# Patient Record
Sex: MALE | Race: WHITE | NOT HISPANIC OR LATINO | URBAN - METROPOLITAN AREA
[De-identification: names, ages, dates, MRNs, and addresses within clinical notes are randomized per-mention and may not be internally consistent; named-entity substitution may affect disease eponyms.]

---

## 2017-06-16 PROBLEM — G47.33 OSA (OBSTRUCTIVE SLEEP APNEA): Chronic | Status: ACTIVE | Noted: 2017-06-16

## 2018-07-10 ENCOUNTER — APPOINTMENT (RX ONLY)
Dept: URBAN - METROPOLITAN AREA CLINIC 349 | Facility: CLINIC | Age: 63
Setting detail: DERMATOLOGY
End: 2018-07-10

## 2018-07-10 DIAGNOSIS — Z71.89 OTHER SPECIFIED COUNSELING: ICD-10-CM

## 2018-07-10 DIAGNOSIS — D18.0 HEMANGIOMA: ICD-10-CM

## 2018-07-10 DIAGNOSIS — D22 MELANOCYTIC NEVI: ICD-10-CM

## 2018-07-10 DIAGNOSIS — L82.1 OTHER SEBORRHEIC KERATOSIS: ICD-10-CM

## 2018-07-10 PROBLEM — L23.7 ALLERGIC CONTACT DERMATITIS DUE TO PLANTS, EXCEPT FOOD: Status: ACTIVE | Noted: 2018-07-10

## 2018-07-10 PROBLEM — D22.5 MELANOCYTIC NEVI OF TRUNK: Status: ACTIVE | Noted: 2018-07-10

## 2018-07-10 PROBLEM — D18.01 HEMANGIOMA OF SKIN AND SUBCUTANEOUS TISSUE: Status: ACTIVE | Noted: 2018-07-10

## 2018-07-10 PROBLEM — F41.9 ANXIETY DISORDER, UNSPECIFIED: Status: ACTIVE | Noted: 2018-07-10

## 2018-07-10 PROCEDURE — 99242 OFF/OP CONSLTJ NEW/EST SF 20: CPT

## 2018-07-10 PROCEDURE — ? COUNSELING

## 2018-07-10 ASSESSMENT — LOCATION DETAILED DESCRIPTION DERM
LOCATION DETAILED: LEFT MEDIAL INFERIOR CHEST
LOCATION DETAILED: LEFT SUPERIOR UPPER BACK
LOCATION DETAILED: LEFT CLAVICULAR SKIN
LOCATION DETAILED: EPIGASTRIC SKIN
LOCATION DETAILED: LEFT MEDIAL INFERIOR CHEST
LOCATION DETAILED: STERNUM
LOCATION DETAILED: RIGHT CLAVICULAR NECK
LOCATION DETAILED: RIGHT SUPERIOR UPPER BACK
LOCATION DETAILED: LEFT CLAVICULAR NECK
LOCATION DETAILED: RIGHT ANTERIOR SHOULDER
LOCATION DETAILED: RIGHT LATERAL ABDOMEN
LOCATION DETAILED: LEFT MEDIAL UPPER BACK
LOCATION DETAILED: RIGHT CLAVICULAR SKIN
LOCATION DETAILED: LEFT CLAVICULAR NECK
LOCATION DETAILED: RIGHT MEDIAL INFERIOR CHEST
LOCATION DETAILED: RIGHT MID-UPPER BACK
LOCATION DETAILED: RIGHT LATERAL SUPERIOR CHEST

## 2018-07-10 ASSESSMENT — LOCATION SIMPLE DESCRIPTION DERM
LOCATION SIMPLE: LEFT ANTERIOR NECK
LOCATION SIMPLE: LEFT UPPER BACK
LOCATION SIMPLE: LEFT ANTERIOR NECK
LOCATION SIMPLE: RIGHT UPPER BACK
LOCATION SIMPLE: ABDOMEN
LOCATION SIMPLE: CHEST
LOCATION SIMPLE: RIGHT UPPER BACK
LOCATION SIMPLE: RIGHT CLAVICULAR SKIN
LOCATION SIMPLE: LEFT CLAVICULAR SKIN
LOCATION SIMPLE: RIGHT SHOULDER
LOCATION SIMPLE: CHEST
LOCATION SIMPLE: RIGHT ANTERIOR NECK

## 2018-07-10 ASSESSMENT — LOCATION ZONE DERM
LOCATION ZONE: TRUNK
LOCATION ZONE: TRUNK
LOCATION ZONE: NECK
LOCATION ZONE: NECK
LOCATION ZONE: ARM

## 2018-09-17 PROBLEM — N40.1 BENIGN PROSTATIC HYPERPLASIA WITH URINARY HESITANCY: Status: ACTIVE | Noted: 2018-09-17

## 2018-09-17 PROBLEM — R97.20 ELEVATED PSA: Status: ACTIVE | Noted: 2018-09-17

## 2018-09-17 PROBLEM — R39.11 BENIGN PROSTATIC HYPERPLASIA WITH URINARY HESITANCY: Status: ACTIVE | Noted: 2018-09-17

## 2019-04-03 ENCOUNTER — PATIENT OUTREACH (OUTPATIENT)
Dept: CASE MANAGEMENT | Age: 64
End: 2019-04-03

## 2019-04-03 NOTE — PATIENT INSTRUCTIONS
4/3/19 Referral received from Adela Salazar NP for resources for local pharmacy, OTC allowance and also transportation. Called pt and states has had issues with getting medications due to cost in the past and stressed the importance to pt with medication compliance and reporting if has issues or side effects with medications asap. Pt states sees pcp but has not been able to get referral to psychiatrist or psychologist and has asked mutiple times. Offered assistance to pt with 3 way call to pcp to request and declined and states has researched [employer] benefit book and has a name of someone he would like to see and will reach out to them. Provided pt with resource for [employer], OTC allowance and also the [employer] transportation portion. Pt also advised of the local Mental health facility in [facility] and hospitals is really wanting to try person he has researched. Pt advised may contact care  with questions and verbalizes an understanding.

## 2019-04-03 NOTE — PROGRESS NOTES
4/3/19 Referral received from Adela Salazar NP for resources for local pharmacy, OTC allowance and also transportation. Called pt and states has had issues with getting medications due to cost in the past and stressed the importance to pt with medication compliance and reporting if has issues or side effects with medications asap. Pt states sees pcp but has not been able to get referral to psychiatrist or psychologist and has asked mutiple times. Offered assistance to pt with 3 way call to pcp to request and declined and states has researched [employer] benefit book and has a name of someone he would like to see and will reach out to them. Provided pt with resource for [employer], OTC allowance and also the [employer] transportation portion. Pt also advised of the local Mental health facility in [facility] and [facility] is really wanting to try person he has researched. Pt advised may contact care  with questions and verbalizes an understanding.

## 2019-07-22 ENCOUNTER — APPOINTMENT (RX ONLY)
Dept: URBAN - METROPOLITAN AREA CLINIC 349 | Facility: CLINIC | Age: 64
Setting detail: DERMATOLOGY
End: 2019-07-22

## 2019-07-22 DIAGNOSIS — L82.1 OTHER SEBORRHEIC KERATOSIS: ICD-10-CM

## 2019-07-22 DIAGNOSIS — D22 MELANOCYTIC NEVI: ICD-10-CM

## 2019-07-22 DIAGNOSIS — D18.0 HEMANGIOMA: ICD-10-CM

## 2019-07-22 PROBLEM — D22.5 MELANOCYTIC NEVI OF TRUNK: Status: ACTIVE | Noted: 2019-07-22

## 2019-07-22 PROBLEM — D18.01 HEMANGIOMA OF SKIN AND SUBCUTANEOUS TISSUE: Status: ACTIVE | Noted: 2019-07-22

## 2019-07-22 PROBLEM — F32.9 MAJOR DEPRESSIVE DISORDER, SINGLE EPISODE, UNSPECIFIED: Status: ACTIVE | Noted: 2019-07-22

## 2019-07-22 PROCEDURE — ? COUNSELING

## 2019-07-22 PROCEDURE — 99213 OFFICE O/P EST LOW 20 MIN: CPT

## 2019-07-22 ASSESSMENT — LOCATION ZONE DERM
LOCATION ZONE: ARM
LOCATION ZONE: TRUNK
LOCATION ZONE: NECK
LOCATION ZONE: NECK
LOCATION ZONE: TRUNK

## 2019-07-22 ASSESSMENT — LOCATION SIMPLE DESCRIPTION DERM
LOCATION SIMPLE: CHEST
LOCATION SIMPLE: RIGHT CLAVICULAR SKIN
LOCATION SIMPLE: RIGHT UPPER BACK
LOCATION SIMPLE: RIGHT SHOULDER
LOCATION SIMPLE: LEFT CLAVICULAR SKIN
LOCATION SIMPLE: LEFT UPPER BACK
LOCATION SIMPLE: ABDOMEN
LOCATION SIMPLE: RIGHT UPPER BACK
LOCATION SIMPLE: CHEST
LOCATION SIMPLE: LEFT ANTERIOR NECK
LOCATION SIMPLE: LEFT ANTERIOR NECK
LOCATION SIMPLE: RIGHT ANTERIOR NECK

## 2019-07-22 ASSESSMENT — LOCATION DETAILED DESCRIPTION DERM
LOCATION DETAILED: LEFT MEDIAL INFERIOR CHEST
LOCATION DETAILED: LEFT SUPERIOR UPPER BACK
LOCATION DETAILED: RIGHT CLAVICULAR NECK
LOCATION DETAILED: LEFT MEDIAL UPPER BACK
LOCATION DETAILED: RIGHT ANTERIOR SHOULDER
LOCATION DETAILED: RIGHT CLAVICULAR SKIN
LOCATION DETAILED: LEFT CLAVICULAR SKIN
LOCATION DETAILED: RIGHT SUPERIOR UPPER BACK
LOCATION DETAILED: RIGHT MID-UPPER BACK
LOCATION DETAILED: LEFT CLAVICULAR NECK
LOCATION DETAILED: RIGHT LATERAL ABDOMEN
LOCATION DETAILED: LEFT MEDIAL INFERIOR CHEST
LOCATION DETAILED: EPIGASTRIC SKIN
LOCATION DETAILED: RIGHT LATERAL SUPERIOR CHEST
LOCATION DETAILED: RIGHT MEDIAL INFERIOR CHEST
LOCATION DETAILED: STERNUM
LOCATION DETAILED: LEFT CLAVICULAR NECK

## 2022-03-18 PROBLEM — R39.11 BENIGN PROSTATIC HYPERPLASIA WITH URINARY HESITANCY: Status: ACTIVE | Noted: 2018-09-17

## 2022-03-18 PROBLEM — N40.1 BENIGN PROSTATIC HYPERPLASIA WITH URINARY HESITANCY: Status: ACTIVE | Noted: 2018-09-17

## 2022-03-19 PROBLEM — G47.33 OSA (OBSTRUCTIVE SLEEP APNEA): Status: ACTIVE | Noted: 2017-06-16

## 2022-03-20 PROBLEM — R97.20 ELEVATED PSA: Status: ACTIVE | Noted: 2018-09-17

## 2022-03-30 ENCOUNTER — HOSPITAL ENCOUNTER (OUTPATIENT)
Dept: ULTRASOUND IMAGING | Age: 67
Discharge: HOME | End: 2022-03-30
Attending: STUDENT IN AN ORGANIZED HEALTH CARE EDUCATION/TRAINING PROGRAM
Payer: MEDICARE

## 2022-03-30 DIAGNOSIS — M79.89 RIGHT LEG SWELLING: ICD-10-CM

## 2022-03-30 PROCEDURE — 93971 EXTREMITY STUDY: CPT

## 2022-03-30 NOTE — PROGRESS NOTES
LVM detailed advising pt  Per Dr Ronan Valero   Ultrasound of right leg negative for DVT/blood clot.

## 2022-05-29 NOTE — PROGRESS NOTES
Kris Gonzales (:  1955) is a 79 y.o. male,Established patient, here for evaluation of the following chief complaint(s):  Follow-up (3 mth fu and refill) and Dizziness         ASSESSMENT/PLAN:  1. Impaired fasting glucose  Fasting blood sugar elevated at 118 on 2022  A1c 6.1% on 3/3/2022, recheck in office today improved at 5.8   Continue lifestyle modifications    2. Dyslipidemia  Lipid panel from 2022 with ASCVD risk of 17.2%  Continue lovastatin    - lovastatin (MEVACOR) 40 MG tablet; Take 1 tablet by mouth nightly  Dispense: 90 tablet; Refill: 3    3. Bipolar affective disorder, current episode mixed, current episode severity unspecified ([facility] [address]  4. Long-term current use of benzodiazepine  PHQ-9 score of 22, BINU-7 score of 18 on 2022  PDMP reviewed showing lorazepam 2 mg tablets, quantity #90, 30-day supply last filled on 4/3/2022. Continue amitriptyline and as needed lorazepam  Psychiatry referral previously placed    5. Essential hypertension  Continue metoprolol tartrate    - metoprolol tartrate (LOPRESSOR) 50 MG tablet; Take 1 tablet by mouth 2 times daily  Dispense: 180 tablet; Refill: 3    6. Dizziness  Differential diagnosis includes BPPV versus dehydration versus iatrogenic hypotension or bradycardia if patient accidentally took extra dose of metoprolol  No focal neurological deficits on exam today, hold off neuroimaging  EKG on 3/3/2022 with sinus tachycardia with heart rate of 120 bpm, T wave inversion in aVL but no other acute ST segment or T wave abnormalities (of note at that time patient had forgotten to take his morning dose of metoprolol)  Maintain adequate hydration, monitor for symptom recurrence        Subjective   SUBJECTIVE/OBJECTIVE:  Patient is a 26-year-old  male who presents to the office today for follow-up.   Patient states last month on May 18 in the afternoon approximately 30 minutes after taking his metoprolol for blood pressure he started feeling dizzy and off-balance. Denied associated chest pain, shortness of breath, numbness, paresthesias or palpitations. Did have some nausea and sensation of off balance with photosensitivity. No phonophobia, headache, vision changes. States it felt different than panic attacks he has had before. Did not check his blood pressure or heart rate at that time. His wife who is concerned he may have been dehydrated made him drink some Gatorade. An hour later patient took his nighttime amitriptyline and lorazepam and fell asleep. The next morning when he woke up he felt slightly fearful but afterward felt back to normal.  Episodes have failed to recur since then. Uncertain if patient took an extra dose of metoprolol by mistake or if it could be due to his eyeglasses with the frames being slightly offkilter. Review of Systems   HENT:        Denies phonophobia   Eyes: Positive for photophobia. Respiratory: Negative for shortness of breath. Cardiovascular: Negative for chest pain and palpitations. Gastrointestinal: Positive for nausea. Neurological: Positive for dizziness and light-headedness. Negative for numbness. Denies paresthesias   Psychiatric/Behavioral: The patient is nervous/anxious. Objective   Physical Exam  Vitals reviewed. Constitutional:       General: He is not in acute distress. Appearance: Normal appearance. He is not ill-appearing, toxic-appearing or diaphoretic. HENT:      Head: Normocephalic and atraumatic. Right Ear: Tympanic membrane, ear canal and external ear normal. There is no impacted cerumen. Left Ear: Ear canal and external ear normal.      Ears:      Comments: Cerumen in left canal limiting complete visualization of the tympanic membrane but from what can be visualized no acute abnormalities     Mouth/Throat:      Pharynx: No oropharyngeal exudate or posterior oropharyngeal erythema. Eyes:      General: No scleral icterus.         Right eye: No discharge. Left eye: No discharge. Extraocular Movements: Extraocular movements intact. Comments: Visual fields grossly intact and symmetric bilaterally   Neck:      Vascular: No carotid bruit. Cardiovascular:      Rate and Rhythm: Normal rate and regular rhythm. Heart sounds: No murmur heard. No friction rub. No gallop. Pulmonary:      Effort: Pulmonary effort is normal. No respiratory distress. Breath sounds: No wheezing, rhonchi or rales. Abdominal:      General: Bowel sounds are normal.      Palpations: Abdomen is soft. Tenderness: There is no abdominal tenderness. There is no guarding. Skin:     General: Skin is warm and dry. Coloration: Skin is not jaundiced. Neurological:      Mental Status: He is alert. Mental status is at baseline. Cranial Nerves: No cranial nerve deficit. Sensory: No sensory deficit. Motor: Tremor (Mild involving both hands) present. No weakness (Muscle strength 5/5 and symmetric in all 4 extremities) or pronator drift. Coordination: Finger-Nose-Finger Test normal.   Psychiatric:         Attention and Perception: Attention normal.         Mood and Affect: Mood and affect normal. Mood is not anxious or depressed. Affect is not tearful. Speech: Speech normal. Speech is not rapid and pressured or slurred. Behavior: Behavior normal. Behavior is not agitated, aggressive or combative. Behavior is cooperative. An electronic signature was used to authenticate this note.     --Cristino Hall,

## 2022-06-02 ENCOUNTER — OFFICE VISIT (OUTPATIENT)
Dept: INTERNAL MEDICINE CLINIC | Facility: CLINIC | Age: 67
End: 2022-06-02
Payer: MEDICARE

## 2022-06-02 VITALS
SYSTOLIC BLOOD PRESSURE: 128 MMHG | WEIGHT: 202 LBS | TEMPERATURE: 97.5 F | HEIGHT: 74 IN | DIASTOLIC BLOOD PRESSURE: 72 MMHG | BODY MASS INDEX: 25.93 KG/M2 | OXYGEN SATURATION: 95 % | HEART RATE: 92 BPM

## 2022-06-02 DIAGNOSIS — E78.5 DYSLIPIDEMIA: ICD-10-CM

## 2022-06-02 DIAGNOSIS — R73.01 IMPAIRED FASTING GLUCOSE: Primary | ICD-10-CM

## 2022-06-02 DIAGNOSIS — F31.60 BIPOLAR AFFECTIVE DISORDER, CURRENT EPISODE MIXED, CURRENT EPISODE SEVERITY UNSPECIFIED (HCC): ICD-10-CM

## 2022-06-02 DIAGNOSIS — I10 ESSENTIAL HYPERTENSION: ICD-10-CM

## 2022-06-02 DIAGNOSIS — Z79.899 LONG-TERM CURRENT USE OF BENZODIAZEPINE: ICD-10-CM

## 2022-06-02 DIAGNOSIS — R42 DIZZINESS: ICD-10-CM

## 2022-06-02 LAB — HBA1C MFR BLD: 5.8 %

## 2022-06-02 PROCEDURE — G8417 CALC BMI ABV UP PARAM F/U: HCPCS | Performed by: STUDENT IN AN ORGANIZED HEALTH CARE EDUCATION/TRAINING PROGRAM

## 2022-06-02 PROCEDURE — 99214 OFFICE O/P EST MOD 30 MIN: CPT | Performed by: STUDENT IN AN ORGANIZED HEALTH CARE EDUCATION/TRAINING PROGRAM

## 2022-06-02 PROCEDURE — 83036 HEMOGLOBIN GLYCOSYLATED A1C: CPT | Performed by: STUDENT IN AN ORGANIZED HEALTH CARE EDUCATION/TRAINING PROGRAM

## 2022-06-02 PROCEDURE — 3017F COLORECTAL CA SCREEN DOC REV: CPT | Performed by: STUDENT IN AN ORGANIZED HEALTH CARE EDUCATION/TRAINING PROGRAM

## 2022-06-02 PROCEDURE — 1036F TOBACCO NON-USER: CPT | Performed by: STUDENT IN AN ORGANIZED HEALTH CARE EDUCATION/TRAINING PROGRAM

## 2022-06-02 PROCEDURE — G8427 DOCREV CUR MEDS BY ELIG CLIN: HCPCS | Performed by: STUDENT IN AN ORGANIZED HEALTH CARE EDUCATION/TRAINING PROGRAM

## 2022-06-02 PROCEDURE — 1123F ACP DISCUSS/DSCN MKR DOCD: CPT | Performed by: STUDENT IN AN ORGANIZED HEALTH CARE EDUCATION/TRAINING PROGRAM

## 2022-06-02 RX ORDER — METOPROLOL TARTRATE 50 MG/1
50 TABLET, FILM COATED ORAL 2 TIMES DAILY
Qty: 180 TABLET | Refills: 3 | Status: SHIPPED | OUTPATIENT
Start: 2022-06-02 | End: 2022-09-01 | Stop reason: SDUPTHER

## 2022-06-02 RX ORDER — LOVASTATIN 40 MG/1
40 TABLET ORAL NIGHTLY
Qty: 90 TABLET | Refills: 3 | Status: SHIPPED | OUTPATIENT
Start: 2022-06-02 | End: 2022-09-01 | Stop reason: SDUPTHER

## 2022-06-02 ASSESSMENT — ENCOUNTER SYMPTOMS
PHOTOPHOBIA: 1
SHORTNESS OF BREATH: 0
NAUSEA: 1

## 2022-08-04 DIAGNOSIS — F31.60 BIPOLAR AFFECTIVE DISORDER, CURRENT EPISODE MIXED, CURRENT EPISODE SEVERITY UNSPECIFIED (HCC): Primary | ICD-10-CM

## 2022-08-04 RX ORDER — LORAZEPAM 2 MG/1
2 TABLET ORAL EVERY 8 HOURS PRN
Qty: 90 TABLET | Refills: 2 | Status: SHIPPED | OUTPATIENT
Start: 2022-08-04 | End: 2022-09-01 | Stop reason: SDUPTHER

## 2022-08-04 NOTE — TELEPHONE ENCOUNTER
Patient requesting refill of lorazepam.  PDMP reviewed showing lorazepam 2 mg tablets, quantity #90, 30-day supply last filled on 7/5/2022. New prescription sent to pharmacy on record. Orders Placed This Encounter    LORazepam (ATIVAN) 2 MG tablet     Sig: Take 1 tablet by mouth every 8 hours as needed for Anxiety for up to 90 days.      Dispense:  90 tablet     Refill:  2

## 2022-08-28 NOTE — PROGRESS NOTES
[facility] (:  1955) is a 79 y.o. male,Established patient, here for evaluation of the following chief complaint(s):  Follow-up (Pt is here for 3 month follow up. ), Medication Refill (Pt needs a refill on Triamcinolone Cream. ), Finger Pain (Pt c/o still having pain in right index finger./), and Skin Problem (Pt c/o a place on his chest and neck that he would like to have checked. )         ASSESSMENT/PLAN:  1. Prediabetes  A1c 5.8% on 2022  Continue lifestyle modifications    2. B[facility] affective disorder, [facility] status unspecified ([facility]  3. Long-term current use of benzodiazepine  PHQ-9 score of 22, BINU-7 score of 18 on 2022  Continue as needed lorazepam  Previously placed referral to psychiatry  PDMP reviewed showing lorazepam 2 mg tablets, quantity #90, 30-day supply last filled on 2022    - LORazepam (ATIVAN) 2 MG tablet; Take 1 tablet by mouth every 8 hours as needed for Anxiety for up to 90 days. Dispense: 270 tablet; Refill: 0    4. Essential hypertension  Continue metoprolol tartrate    - metoprolol tartrate (LOPRESSOR) 50 MG tablet; Take 1 tablet by mouth 2 times daily  Dispense: 180 tablet; Refill: 2    5. Dyslipidemia  Lipid panel from 2022 with ASCVD risk of 17.2%  Continue lovastatin    - lovastatin (MEVACOR) 40 MG tablet; Take 1 tablet by mouth nightly  Dispense: 90 tablet; Refill: 2    6. Insomnia, unspecified type  Continue amitriptyline    - amitriptyline (ELAVIL) 50 MG tablet; Take 1 tablet by mouth nightly  Dispense: 90 tablet; Refill: 2    7. History of dermatitis  Refill as needed triamcinolone    - triamcinolone (KENALOG) 0.1 % cream; Use thin layer to affected area as needed for rash  Dispense: 454 g; Refill: 1    8. Skin lesion of chest wall  9. Skin lesion of neck  10.  Skin cancer screening  Suspect chest wall lesion likely hemangioma, neck lesion likely skin tag  Given history of long-term sun exposure refer to dermatology for evaluation and skin cancer screening    - External Referral to Dermatology    11. Encounter for vaccination  Patient recently nicked by a cat, uncertain when last tetanus vaccine was  Tdap vaccination administered in office today    - Tdap, [address], (age 8 yrs+), IM      Return in about 3 months (around 12/1/2022) for EOV . Subjective   SUBJECTIVE/OBJECTIVE:  Patient is a 71-year-old  male who presents to the office today for follow-up. Patient tolerating as needed lorazepam not always taking up to 3 times a day depending on his mood. Sometimes takes it before he goes to [employer] but denies grogginess or difficulty driving. Sleep seems to be improved with use of amitriptyline. Has still not heard from psychiatry. No suicidal or homicidal ideations. Has had a skin lesion slightly raised and red in color on the anterior chest wall for the past several months, sometimes decreasing in size. Has tried a variety of topical therapies. Also has a separate skin lesion on his neck and his nose for the past 2 weeks. Neither area is causing him pain or itching. Denies chest pain, shortness of breath, abdominal pain, suicidal or homicidal ideations. Patient still having intermittent pain in both index fingers but without significant swelling, redness or warmth. Believes it is due to constantly using his fingers and twisting and wrenching motions. Review of Systems   Respiratory:  Negative for shortness of breath. Cardiovascular:  Negative for chest pain. Gastrointestinal:  Negative for abdominal pain. Musculoskeletal:  Positive for arthralgias. Negative for joint swelling. Skin:         Positive for skin lesion on neck and chest   Psychiatric/Behavioral:  Negative for self-injury and suicidal ideas. The patient is nervous/anxious. Objective   Physical Exam  Vitals reviewed. Constitutional:       General: He is not in acute distress. Appearance: Normal appearance.  He is not ill-appearing, toxic-appearing or diaphoretic. HENT:      Head: Normocephalic and atraumatic. Eyes:      General:         Right eye: No discharge. Left eye: No discharge. Extraocular Movements: Extraocular movements intact. Cardiovascular:      Rate and Rhythm: Normal rate and regular rhythm. Heart sounds: No murmur heard. No friction rub. No gallop. Pulmonary:      Effort: Pulmonary effort is normal. No respiratory distress. Breath sounds: No wheezing, rhonchi or rales. Abdominal:      General: Bowel sounds are normal.      Palpations: Abdomen is soft. Tenderness: There is no abdominal tenderness. There is no guarding. Musculoskeletal:      Comments: 1 cm superficial skin break along left calf without erythema, streaking, fluctuance, bleeding or drainage   Skin:     General: Skin is warm and dry. Coloration: Skin is not jaundiced. Comments: Approxi-1 cm cherry colored papule along right chest wall  Approxi-1 cm flesh-colored tag-like lesion of left anterolateral neck   Neurological:      Mental Status: He is alert. Mental status is at baseline. Psychiatric:         Attention and Perception: Attention normal.         Mood and Affect: Mood and affect normal. Mood is not depressed. Affect is not tearful. Speech: Speech normal. Speech is not rapid and pressured or slurred. Behavior: Behavior normal. Behavior is not agitated, aggressive or combative. Behavior is cooperative. Thought Content: Thought content normal. Thought content does not include homicidal or suicidal plan. An electronic signature was used to authenticate this note.     --Yusuf Ashby,

## 2022-09-01 ENCOUNTER — OFFICE VISIT (OUTPATIENT)
Dept: INTERNAL MEDICINE CLINIC | Facility: CLINIC | Age: 67
End: 2022-09-01
Payer: MEDICARE

## 2022-09-01 VITALS
DIASTOLIC BLOOD PRESSURE: 74 MMHG | RESPIRATION RATE: 16 BRPM | TEMPERATURE: 97.9 F | OXYGEN SATURATION: 96 % | SYSTOLIC BLOOD PRESSURE: 116 MMHG | HEART RATE: 87 BPM | HEIGHT: 74 IN | WEIGHT: 196.6 LBS | BODY MASS INDEX: 25.23 KG/M2

## 2022-09-01 DIAGNOSIS — Z87.2 HISTORY OF DERMATITIS: ICD-10-CM

## 2022-09-01 DIAGNOSIS — Z12.83 SKIN CANCER SCREENING: ICD-10-CM

## 2022-09-01 DIAGNOSIS — Z23 ENCOUNTER FOR VACCINATION: ICD-10-CM

## 2022-09-01 DIAGNOSIS — L98.9 SKIN LESION OF NECK: ICD-10-CM

## 2022-09-01 DIAGNOSIS — Z79.899 LONG-TERM CURRENT USE OF BENZODIAZEPINE: ICD-10-CM

## 2022-09-01 DIAGNOSIS — E78.5 DYSLIPIDEMIA: ICD-10-CM

## 2022-09-01 DIAGNOSIS — G47.00 INSOMNIA, UNSPECIFIED TYPE: ICD-10-CM

## 2022-09-01 DIAGNOSIS — L98.9 SKIN LESION OF CHEST WALL: ICD-10-CM

## 2022-09-01 DIAGNOSIS — I10 ESSENTIAL HYPERTENSION: ICD-10-CM

## 2022-09-01 DIAGNOSIS — R73.03 PREDIABETES: Primary | ICD-10-CM

## 2022-09-01 DIAGNOSIS — F31.9 BIPOLAR AFFECTIVE DISORDER, REMISSION STATUS UNSPECIFIED (HCC): ICD-10-CM

## 2022-09-01 PROCEDURE — G8417 CALC BMI ABV UP PARAM F/U: HCPCS | Performed by: STUDENT IN AN ORGANIZED HEALTH CARE EDUCATION/TRAINING PROGRAM

## 2022-09-01 PROCEDURE — 1123F ACP DISCUSS/DSCN MKR DOCD: CPT | Performed by: STUDENT IN AN ORGANIZED HEALTH CARE EDUCATION/TRAINING PROGRAM

## 2022-09-01 PROCEDURE — 3017F COLORECTAL CA SCREEN DOC REV: CPT | Performed by: STUDENT IN AN ORGANIZED HEALTH CARE EDUCATION/TRAINING PROGRAM

## 2022-09-01 PROCEDURE — 90715 TDAP VACCINE 7 YRS/> IM: CPT | Performed by: STUDENT IN AN ORGANIZED HEALTH CARE EDUCATION/TRAINING PROGRAM

## 2022-09-01 PROCEDURE — 99214 OFFICE O/P EST MOD 30 MIN: CPT | Performed by: STUDENT IN AN ORGANIZED HEALTH CARE EDUCATION/TRAINING PROGRAM

## 2022-09-01 PROCEDURE — G8427 DOCREV CUR MEDS BY ELIG CLIN: HCPCS | Performed by: STUDENT IN AN ORGANIZED HEALTH CARE EDUCATION/TRAINING PROGRAM

## 2022-09-01 PROCEDURE — 1036F TOBACCO NON-USER: CPT | Performed by: STUDENT IN AN ORGANIZED HEALTH CARE EDUCATION/TRAINING PROGRAM

## 2022-09-01 PROCEDURE — 90471 IMMUNIZATION ADMIN: CPT | Performed by: STUDENT IN AN ORGANIZED HEALTH CARE EDUCATION/TRAINING PROGRAM

## 2022-09-01 RX ORDER — AMITRIPTYLINE HYDROCHLORIDE 50 MG/1
50 TABLET, FILM COATED ORAL NIGHTLY
Qty: 90 TABLET | Refills: 2 | Status: SHIPPED | OUTPATIENT
Start: 2022-09-01

## 2022-09-01 RX ORDER — LORAZEPAM 2 MG/1
2 TABLET ORAL EVERY 8 HOURS PRN
Qty: 270 TABLET | Refills: 0 | Status: SHIPPED | OUTPATIENT
Start: 2022-09-03 | End: 2022-12-02

## 2022-09-01 RX ORDER — TRIAMCINOLONE ACETONIDE 1 MG/G
CREAM TOPICAL
Qty: 454 G | Refills: 1 | Status: SHIPPED | OUTPATIENT
Start: 2022-09-01

## 2022-09-01 RX ORDER — METOPROLOL TARTRATE 50 MG/1
50 TABLET, FILM COATED ORAL 2 TIMES DAILY
Qty: 180 TABLET | Refills: 2 | Status: SHIPPED | OUTPATIENT
Start: 2022-09-01

## 2022-09-01 RX ORDER — LOVASTATIN 40 MG/1
40 TABLET ORAL NIGHTLY
Qty: 90 TABLET | Refills: 2 | Status: SHIPPED | OUTPATIENT
Start: 2022-09-01

## 2022-09-01 ASSESSMENT — ENCOUNTER SYMPTOMS
SHORTNESS OF BREATH: 0
ABDOMINAL PAIN: 0

## 2022-09-01 ASSESSMENT — PATIENT HEALTH QUESTIONNAIRE - PHQ9
SUM OF ALL RESPONSES TO PHQ QUESTIONS 1-9: 0
1. LITTLE INTEREST OR PLEASURE IN DOING THINGS: 0
SUM OF ALL RESPONSES TO PHQ QUESTIONS 1-9: 0
SUM OF ALL RESPONSES TO PHQ QUESTIONS 1-9: 0
SUM OF ALL RESPONSES TO PHQ9 QUESTIONS 1 & 2: 0
2. FEELING DOWN, DEPRESSED OR HOPELESS: 0
SUM OF ALL RESPONSES TO PHQ QUESTIONS 1-9: 0

## 2022-12-04 NOTE — PROGRESS NOTES
Krys Combs (:  1955) is a 79 y.o. male,Established patient, here for evaluation of the following chief complaint(s):  Follow-up (3 mth fu), Anxiety, and Depression         ASSESSMENT/PLAN:  1. Essential hypertension  Continue metoprolol tartrate    - CBC with Auto Differential; Future  - Comprehensive Metabolic Panel; Future  - Lipid Panel; Future  - T4, Free; Future  - TSH; Future    2. Dyslipidemia  Recheck lipid panel, calculate ASCVD risk  Continue lovastatin    - CBC with Auto Differential; Future  - Comprehensive Metabolic Panel; Future  - Lipid Panel; Future  - T4, Free; Future  - TSH; Future    3. Prediabetes  A1c 5.8% on 2022, recheck at next lab draw  Continue lifestyle modifications    - Hemoglobin A1C; Future    4. Bipolar affective disorder, current episode mixed, current episode severity unspecified ([facility] [address]  5. Long-term current use of benzodiazepine  PHQ-9 score of 22, BINU-7 score of 18 on 2022  Continue as needed lorazepam  PDMP reviewed showing lorazepam 2 mg tablets, quantity #270, 90-day supply last filled on 9/3/2022  Previously placed referral to psychiatry    - LORazepam (ATIVAN) 2 MG tablet; Take 1 tablet by mouth every 8 hours as needed for Anxiety for up to 90 days. Dispense: 270 tablet; Refill: 0    6. Chronic insomnia  Continue amitriptyline    - amitriptyline (ELAVIL) 50 MG tablet; Take 1 tablet by mouth nightly  Dispense: 90 tablet; Refill: 2    7. Colon cancer screening  Discussed with patient indications and options for colorectal cancer screening including screening colonoscopy versus stool based testing  Patient verbalizes understanding and desires to proceed with screening colonoscopy  Referral to gastroenterology placed    - [facility]    8.  Prostate cancer screening  Discussed with patient indications, risks vs benefits of prostate cancer screening with options including HETAL vs PSA testing  Patient verbalizes understanding and desires to proceed with psa testing     - PSA Screening; Future    9. Skin cancer screening  New referral to dermatology placed for skin cancer screening in addition to lesions patient would like removed    - [facility]        Return for EOV, fasting labs prior . Subjective   SUBJECTIVE/OBJECTIVE:  Patient is a 80-year-old  male who presents to the office today for follow-up. Patient overall is doing well. Has occasional days where he feels down but no suicidal or homicidal ideations. Appetite is normal and he denies insomnia. Does not take lorazepam more than prescribed and sometimes will break tablets in half. Denies significant drowsiness if he takes his lorazepam and amitriptyline at nighttime. Denies chest pain, shortness of breath, abdominal pain, dizziness or lightheadedness. Denies frequent bouts of forgetfulness, problems managing his finances or other significant difficulties with memory. Review of Systems   Constitutional:  Negative for appetite change. Respiratory:  Negative for shortness of breath. Cardiovascular:  Negative for chest pain. Gastrointestinal:  Negative for abdominal pain. Neurological:  Negative for dizziness and light-headedness. Psychiatric/Behavioral:  Negative for self-injury, sleep disturbance and suicidal ideas. Occasional depression        Objective   Physical Exam  Vitals reviewed. Constitutional:       General: He is not in acute distress. Appearance: Normal appearance. He is not ill-appearing, toxic-appearing or diaphoretic. HENT:      Head: Normocephalic and atraumatic. Eyes:      General: No visual field deficit. Right eye: No discharge. Left eye: No discharge. Extraocular Movements: Extraocular movements intact. Comments: Visual fields grossly intact and symmetric bilaterally   Neck:      Vascular: No carotid bruit.    Cardiovascular:      Rate and Rhythm: Normal rate and regular rhythm. Heart sounds: No murmur heard. No friction rub. No gallop. Pulmonary:      Effort: Pulmonary effort is normal. No respiratory distress. Breath sounds: No wheezing, rhonchi or rales. Abdominal:      General: Bowel sounds are normal.      Palpations: Abdomen is soft. Tenderness: There is no abdominal tenderness. There is no guarding. Musculoskeletal:      Right lower leg: Edema present. Comments: Right lower extremity slightly more swollen than left, chronic   Skin:     General: Skin is dry. Coloration: Skin is not jaundiced. Neurological:      Mental Status: He is alert. Mental status is at baseline. Cranial Nerves: No cranial nerve deficit, dysarthria or facial asymmetry. Sensory: No sensory deficit. Motor: No weakness (Muscle strength 5/5 in all 4 extremities) or pronator drift. Coordination: Finger-Nose-Finger Test normal.   Psychiatric:         Attention and Perception: Attention normal.         Mood and Affect: Mood and affect normal. Mood is not anxious or depressed. Affect is not tearful. Speech: Speech normal. Speech is not rapid and pressured or slurred. Behavior: Behavior normal. Behavior is not agitated, aggressive or combative. Behavior is cooperative. Thought Content: Thought content normal. Thought content does not include homicidal or suicidal plan. An electronic signature was used to authenticate this note.     --Lucas Melchor, DO

## 2022-12-05 ENCOUNTER — OFFICE VISIT (OUTPATIENT)
Dept: INTERNAL MEDICINE CLINIC | Facility: CLINIC | Age: 67
End: 2022-12-05
Payer: MEDICARE

## 2022-12-05 VITALS
TEMPERATURE: 97.9 F | SYSTOLIC BLOOD PRESSURE: 118 MMHG | HEIGHT: 74 IN | WEIGHT: 202.8 LBS | OXYGEN SATURATION: 95 % | HEART RATE: 84 BPM | BODY MASS INDEX: 26.03 KG/M2 | DIASTOLIC BLOOD PRESSURE: 78 MMHG

## 2022-12-05 DIAGNOSIS — Z79.899 LONG-TERM CURRENT USE OF BENZODIAZEPINE: ICD-10-CM

## 2022-12-05 DIAGNOSIS — E78.5 DYSLIPIDEMIA: ICD-10-CM

## 2022-12-05 DIAGNOSIS — I10 ESSENTIAL HYPERTENSION: Primary | ICD-10-CM

## 2022-12-05 DIAGNOSIS — Z12.5 PROSTATE CANCER SCREENING: ICD-10-CM

## 2022-12-05 DIAGNOSIS — G47.00 INSOMNIA, UNSPECIFIED TYPE: ICD-10-CM

## 2022-12-05 DIAGNOSIS — Z12.83 SKIN CANCER SCREENING: ICD-10-CM

## 2022-12-05 DIAGNOSIS — Z12.11 COLON CANCER SCREENING: ICD-10-CM

## 2022-12-05 DIAGNOSIS — R73.03 PREDIABETES: ICD-10-CM

## 2022-12-05 DIAGNOSIS — F51.04 CHRONIC INSOMNIA: ICD-10-CM

## 2022-12-05 DIAGNOSIS — F31.60 BIPOLAR AFFECTIVE DISORDER, CURRENT EPISODE MIXED, CURRENT EPISODE SEVERITY UNSPECIFIED (HCC): ICD-10-CM

## 2022-12-05 PROCEDURE — 1036F TOBACCO NON-USER: CPT | Performed by: STUDENT IN AN ORGANIZED HEALTH CARE EDUCATION/TRAINING PROGRAM

## 2022-12-05 PROCEDURE — 3074F SYST BP LT 130 MM HG: CPT | Performed by: STUDENT IN AN ORGANIZED HEALTH CARE EDUCATION/TRAINING PROGRAM

## 2022-12-05 PROCEDURE — 1123F ACP DISCUSS/DSCN MKR DOCD: CPT | Performed by: STUDENT IN AN ORGANIZED HEALTH CARE EDUCATION/TRAINING PROGRAM

## 2022-12-05 PROCEDURE — G8427 DOCREV CUR MEDS BY ELIG CLIN: HCPCS | Performed by: STUDENT IN AN ORGANIZED HEALTH CARE EDUCATION/TRAINING PROGRAM

## 2022-12-05 PROCEDURE — 99214 OFFICE O/P EST MOD 30 MIN: CPT | Performed by: STUDENT IN AN ORGANIZED HEALTH CARE EDUCATION/TRAINING PROGRAM

## 2022-12-05 PROCEDURE — G8417 CALC BMI ABV UP PARAM F/U: HCPCS | Performed by: STUDENT IN AN ORGANIZED HEALTH CARE EDUCATION/TRAINING PROGRAM

## 2022-12-05 PROCEDURE — 3017F COLORECTAL CA SCREEN DOC REV: CPT | Performed by: STUDENT IN AN ORGANIZED HEALTH CARE EDUCATION/TRAINING PROGRAM

## 2022-12-05 PROCEDURE — G8484 FLU IMMUNIZE NO ADMIN: HCPCS | Performed by: STUDENT IN AN ORGANIZED HEALTH CARE EDUCATION/TRAINING PROGRAM

## 2022-12-05 PROCEDURE — 3078F DIAST BP <80 MM HG: CPT | Performed by: STUDENT IN AN ORGANIZED HEALTH CARE EDUCATION/TRAINING PROGRAM

## 2022-12-05 RX ORDER — AMITRIPTYLINE HYDROCHLORIDE 50 MG/1
50 TABLET, FILM COATED ORAL NIGHTLY
Qty: 90 TABLET | Refills: 2 | Status: SHIPPED | OUTPATIENT
Start: 2022-12-05

## 2022-12-05 RX ORDER — AMITRIPTYLINE HYDROCHLORIDE 50 MG/1
50 TABLET, FILM COATED ORAL NIGHTLY
Qty: 90 TABLET | Refills: 0 | OUTPATIENT
Start: 2022-12-05

## 2022-12-05 RX ORDER — LORAZEPAM 2 MG/1
2 TABLET ORAL EVERY 8 HOURS PRN
Qty: 270 TABLET | Refills: 0 | Status: SHIPPED | OUTPATIENT
Start: 2022-12-05 | End: 2023-03-05

## 2022-12-05 RX ORDER — LORAZEPAM 2 MG/1
2 TABLET ORAL EVERY 6 HOURS PRN
COMMUNITY
End: 2022-12-05 | Stop reason: SDUPTHER

## 2022-12-05 ASSESSMENT — ENCOUNTER SYMPTOMS
ABDOMINAL PAIN: 0
SHORTNESS OF BREATH: 0

## 2023-02-27 ENCOUNTER — TELEPHONE (OUTPATIENT)
Dept: INTERNAL MEDICINE CLINIC | Facility: CLINIC | Age: 68
End: 2023-02-27

## 2023-02-27 DIAGNOSIS — I10 ESSENTIAL HYPERTENSION: ICD-10-CM

## 2023-02-27 DIAGNOSIS — R73.03 PREDIABETES: ICD-10-CM

## 2023-02-27 DIAGNOSIS — Z12.5 PROSTATE CANCER SCREENING: ICD-10-CM

## 2023-02-27 DIAGNOSIS — E78.5 DYSLIPIDEMIA: ICD-10-CM

## 2023-02-27 LAB
ALBUMIN SERPL-MCNC: 4 G/DL (ref 3.2–4.6)
ALBUMIN/GLOB SERPL: 1.2 (ref 0.4–1.6)
ALP SERPL-CCNC: 57 U/L (ref 50–136)
ALT SERPL-CCNC: 26 U/L (ref 12–65)
ANION GAP SERPL CALC-SCNC: 4 MMOL/L (ref 2–11)
AST SERPL-CCNC: 28 U/L (ref 15–37)
BASOPHILS # BLD: 0.8 K/UL (ref 0–0.2)
BASOPHILS NFR BLD: 4 % (ref 0–2)
BILIRUB SERPL-MCNC: 0.5 MG/DL (ref 0.2–1.1)
BUN SERPL-MCNC: 12 MG/DL (ref 8–23)
CALCIUM SERPL-MCNC: 9 MG/DL (ref 8.3–10.4)
CHLORIDE SERPL-SCNC: 107 MMOL/L (ref 101–110)
CHOLEST SERPL-MCNC: 145 MG/DL
CO2 SERPL-SCNC: 30 MMOL/L (ref 21–32)
CREAT SERPL-MCNC: 1.1 MG/DL (ref 0.8–1.5)
DIFFERENTIAL METHOD BLD: ABNORMAL
EOSINOPHIL # BLD: 0.6 K/UL (ref 0–0.8)
EOSINOPHIL NFR BLD: 3 % (ref 0.5–7.8)
ERYTHROCYTE [DISTWIDTH] IN BLOOD BY AUTOMATED COUNT: 13.4 % (ref 11.9–14.6)
EST. AVERAGE GLUCOSE BLD GHB EST-MCNC: 123 MG/DL
GLOBULIN SER CALC-MCNC: 3.4 G/DL (ref 2.8–4.5)
GLUCOSE SERPL-MCNC: 112 MG/DL (ref 65–100)
HBA1C MFR BLD: 5.9 % (ref 4.8–5.6)
HCT VFR BLD AUTO: 46.9 % (ref 41.1–50.3)
HDLC SERPL-MCNC: 45 MG/DL (ref 40–60)
HDLC SERPL: 3.2
HGB BLD-MCNC: 15.1 G/DL (ref 13.6–17.2)
IMM GRANULOCYTES # BLD AUTO: 1.9 K/UL (ref 0–0.5)
IMM GRANULOCYTES NFR BLD AUTO: 10 % (ref 0–5)
LDLC SERPL CALC-MCNC: 70.8 MG/DL
LYMPHOCYTES # BLD: 3.3 K/UL (ref 0.5–4.6)
LYMPHOCYTES NFR BLD: 18 % (ref 13–44)
MCH RBC QN AUTO: 32.7 PG (ref 26.1–32.9)
MCHC RBC AUTO-ENTMCNC: 32.2 G/DL (ref 31.4–35)
MCV RBC AUTO: 101.5 FL (ref 82–102)
MONOCYTES # BLD: 1 K/UL (ref 0.1–1.3)
MONOCYTES NFR BLD: 5 % (ref 4–12)
NEUTS SEG # BLD: 11.5 K/UL (ref 1.7–8.2)
NEUTS SEG NFR BLD: 60 % (ref 43–78)
NRBC # BLD: 0 K/UL (ref 0–0.2)
PLATELET # BLD AUTO: 337 K/UL (ref 150–450)
PMV BLD AUTO: 10.5 FL (ref 9.4–12.3)
POTASSIUM SERPL-SCNC: 4.4 MMOL/L (ref 3.5–5.1)
PROT SERPL-MCNC: 7.4 G/DL (ref 6.3–8.2)
PSA SERPL-MCNC: 6 NG/ML
RBC # BLD AUTO: 4.62 M/UL (ref 4.23–5.6)
SODIUM SERPL-SCNC: 141 MMOL/L (ref 133–143)
T4 FREE SERPL-MCNC: 0.9 NG/DL (ref 0.78–1.46)
TRIGL SERPL-MCNC: 146 MG/DL (ref 35–150)
TSH, 3RD GENERATION: 0.68 UIU/ML (ref 0.36–3.74)
VLDLC SERPL CALC-MCNC: 29.2 MG/DL (ref 6–23)
WBC # BLD AUTO: 19 K/UL (ref 4.3–11.1)

## 2023-02-27 NOTE — TELEPHONE ENCOUNTER
Personally called and spoke with patient who confirmed identity via date of birth to discuss lab results.  Of note patient's white blood count is elevated at 19.  He denies recent use of prednisone.  No fevers, cough, vomiting or other symptoms of infection.  States a little less than 2 weeks ago after doing a lot of heavy lifting he felt a strain in his back and then noticed 3 to 4 days of gross hematuria since resolved.  Has a history of this in the past and was told by his urologist it was likely due to ruptured blood vessel.  No history of kidney stones.  Also discussed with patient elevated PSA level.  Patient has a history of this undergoing biopsies in the past reportedly benign.  States his PSA level has been as high as the double digits in the past.  Advised patient that we will recheck his CBC at his upcoming appointment to determine if additional investigation for leukocytosis is indicated.  He verbalized understanding and agreement.

## 2023-03-05 NOTE — PROGRESS NOTES
Jorge Collier (:  1955) is a 79 y.o. male,Established patient, here for evaluation of the following chief complaint(s):  Follow-up (3 month Follow Up /Discuss Colonoscopy results. Simone Angles like to discuss his body weight today as well. ) and Medication Refill         ASSESSMENT/PLAN:  1. Essential hypertension  Continue metoprolol tartrate at current dose given blood pressure well controlled in office today    - metoprolol tartrate (LOPRESSOR) 50 MG tablet; Take 1 tablet by mouth 2 times daily  Dispense: 180 tablet; Refill: 2    2. Bipolar affective disorder, current episode mixed, current episode severity unspecified ([facility] [address]  3. Long-term current use of benzodiazepine  Continue as needed lorazepam at current dose given symptoms well controlled without adverse effects  PDMP reviewed showing lorazepam 2 mg tablets, quantity #270, 90-day supply last filled on 2022    - LORazepam (ATIVAN) 2 MG tablet; Take 1 tablet by mouth every 8 hours as needed for Anxiety for up to 90 days. Dispense: 270 tablet; Refill: 0    4. Dyslipidemia  Lipid panel from 2023 reviewed with LDL at goal  Continue current dose of lovastatin    - lovastatin (MEVACOR) 40 MG tablet; Take 1 tablet by mouth nightly  Dispense: 90 tablet; Refill: 2    5. Prediabetes  A1c 5.9% on 2023  Continue lifestyle modifications    6. Chronic insomnia  Continue amitriptyline    - amitriptyline (ELAVIL) 50 MG tablet; Take 1 tablet by mouth nightly  Dispense: 90 tablet; Refill: 2    7. Elevated PSA measurement  PSA level elevated at 6 on 2023  Per patient longstanding history of elevated PSA levels undergoing multiple biopsies in the past reportedly benign  Prior urology notes reviewed with history of prostate biopsy in May 2005 for a PSA of 8.9, biopsy in 2016 for PSA of 22 (also underwent TURP secondary to urinary retention and failed voiding trials).   Biopsy results were negative for malignancy at that time however were notable for inflammation  Discussed with patient referring back to urology for continued surveillance and monitoring but he does not feel it is necessary at this time. Proceed with repeat PSA testing in 3 to 6 months with patient to alert provider should he experience symptoms in the interim    8. Leukocytosis, unspecified type  White blood cell count of 19 on 2/27/2023  Differential notable for increased basophils, neutrophils and immature granulocytes  Previously discussed these results with the patient who denied recent corticosteroid use or other symptoms of acute illness  Recheck CBC as well as peripheral smear to evaluate for abnormal morphology and confirm leukocytosis    - CBC with Auto Differential; Future  - Path Review, Smear; Future      Return in about 3 months (around 6/6/2023) for EOV, nonfasting labs in the next 2 weeks . Subjective   SUBJECTIVE/OBJECTIVE:  Patient is a 59-year-old  male who presents to the office today for follow-up and medication refill. Patient overall is doing well. I had previously spoken with patient prior to this visit where he noted episode of gross hematuria, short-lived, seemingly precipitated by heavy lifting. Has since resolved. Patient's labs notable for elevated PSA of 6. Patient states that this has been an ongoing issue for which he has undergone multiple prostate biopsies reportedly benign. Levels have been as high as 22 in the past.  No straining with urination, dysuria, recent hematuria, nocturia, abdominal pain, back pain, fevers, chills, night sweats. Appetite is well. Patient does not feel as if he needs to go back to urology at this time. Did have some anxiety associated with his recent colonoscopy but otherwise doing well. Takes lorazepam on average twice a day without significant grogginess. Still takes amitriptyline at nighttime to assist with sleep experiencing good quality of sleep. No chest pain or shortness of breath.       Review of Systems   Constitutional:  Negative for appetite change, chills, diaphoresis, fever and unexpected weight change. Respiratory:  Negative for shortness of breath. Cardiovascular:  Negative for chest pain. Gastrointestinal:  Negative for abdominal pain. Genitourinary:  Negative for difficulty urinating, dysuria and hematuria. Musculoskeletal:  Negative for back pain. Objective   Physical Exam  Vitals reviewed. Constitutional:       General: He is not in acute distress. Appearance: Normal appearance. He is not ill-appearing, toxic-appearing or diaphoretic. HENT:      Head: Normocephalic and atraumatic. Eyes:      General:         Right eye: No discharge. Left eye: No discharge. Extraocular Movements: Extraocular movements intact. Neck:      Vascular: No carotid bruit. Cardiovascular:      Rate and Rhythm: Normal rate and regular rhythm. Heart sounds: No murmur heard. No friction rub. No gallop. Pulmonary:      Effort: Pulmonary effort is normal. No respiratory distress. Breath sounds: No wheezing, rhonchi or rales. Abdominal:      General: Bowel sounds are normal.      Palpations: Abdomen is soft. Tenderness: There is no abdominal tenderness. There is no right CVA tenderness, left CVA tenderness or guarding. Skin:     General: Skin is dry. Coloration: Skin is not jaundiced. Neurological:      Mental Status: He is alert. Mental status is at baseline. Psychiatric:         Attention and Perception: Attention normal.         Mood and Affect: Mood and affect normal. Mood is not anxious or depressed. Affect is not tearful. Speech: Speech normal. Speech is not rapid and pressured or slurred. Behavior: Behavior normal. Behavior is not agitated, aggressive or combative. Behavior is cooperative. Thought Content: Thought content normal.                An electronic signature was used to authenticate this note.     --Blake Soto, DO

## 2023-03-06 ENCOUNTER — OFFICE VISIT (OUTPATIENT)
Dept: INTERNAL MEDICINE CLINIC | Facility: CLINIC | Age: 68
End: 2023-03-06
Payer: MEDICARE

## 2023-03-06 VITALS
BODY MASS INDEX: 26.03 KG/M2 | OXYGEN SATURATION: 97 % | TEMPERATURE: 96.9 F | RESPIRATION RATE: 16 BRPM | HEART RATE: 88 BPM | SYSTOLIC BLOOD PRESSURE: 120 MMHG | DIASTOLIC BLOOD PRESSURE: 78 MMHG | HEIGHT: 74 IN | WEIGHT: 202.8 LBS

## 2023-03-06 DIAGNOSIS — D72.829 LEUKOCYTOSIS, UNSPECIFIED TYPE: ICD-10-CM

## 2023-03-06 DIAGNOSIS — Z79.899 LONG-TERM CURRENT USE OF BENZODIAZEPINE: ICD-10-CM

## 2023-03-06 DIAGNOSIS — F31.60 BIPOLAR AFFECTIVE DISORDER, CURRENT EPISODE MIXED, CURRENT EPISODE SEVERITY UNSPECIFIED (HCC): ICD-10-CM

## 2023-03-06 DIAGNOSIS — I10 ESSENTIAL HYPERTENSION: Primary | ICD-10-CM

## 2023-03-06 DIAGNOSIS — F51.04 CHRONIC INSOMNIA: ICD-10-CM

## 2023-03-06 DIAGNOSIS — R73.03 PREDIABETES: ICD-10-CM

## 2023-03-06 DIAGNOSIS — R97.20 ELEVATED PSA MEASUREMENT: ICD-10-CM

## 2023-03-06 DIAGNOSIS — E78.5 DYSLIPIDEMIA: ICD-10-CM

## 2023-03-06 PROCEDURE — 99214 OFFICE O/P EST MOD 30 MIN: CPT | Performed by: STUDENT IN AN ORGANIZED HEALTH CARE EDUCATION/TRAINING PROGRAM

## 2023-03-06 PROCEDURE — 3017F COLORECTAL CA SCREEN DOC REV: CPT | Performed by: STUDENT IN AN ORGANIZED HEALTH CARE EDUCATION/TRAINING PROGRAM

## 2023-03-06 PROCEDURE — 1123F ACP DISCUSS/DSCN MKR DOCD: CPT | Performed by: STUDENT IN AN ORGANIZED HEALTH CARE EDUCATION/TRAINING PROGRAM

## 2023-03-06 PROCEDURE — G8427 DOCREV CUR MEDS BY ELIG CLIN: HCPCS | Performed by: STUDENT IN AN ORGANIZED HEALTH CARE EDUCATION/TRAINING PROGRAM

## 2023-03-06 PROCEDURE — G8417 CALC BMI ABV UP PARAM F/U: HCPCS | Performed by: STUDENT IN AN ORGANIZED HEALTH CARE EDUCATION/TRAINING PROGRAM

## 2023-03-06 PROCEDURE — 3074F SYST BP LT 130 MM HG: CPT | Performed by: STUDENT IN AN ORGANIZED HEALTH CARE EDUCATION/TRAINING PROGRAM

## 2023-03-06 PROCEDURE — 1036F TOBACCO NON-USER: CPT | Performed by: STUDENT IN AN ORGANIZED HEALTH CARE EDUCATION/TRAINING PROGRAM

## 2023-03-06 PROCEDURE — G8484 FLU IMMUNIZE NO ADMIN: HCPCS | Performed by: STUDENT IN AN ORGANIZED HEALTH CARE EDUCATION/TRAINING PROGRAM

## 2023-03-06 PROCEDURE — 3078F DIAST BP <80 MM HG: CPT | Performed by: STUDENT IN AN ORGANIZED HEALTH CARE EDUCATION/TRAINING PROGRAM

## 2023-03-06 RX ORDER — LORAZEPAM 2 MG/1
2 TABLET ORAL EVERY 8 HOURS PRN
Qty: 270 TABLET | Refills: 0 | Status: SHIPPED | OUTPATIENT
Start: 2023-03-06 | End: 2023-06-04

## 2023-03-06 RX ORDER — LOVASTATIN 40 MG/1
40 TABLET ORAL NIGHTLY
Qty: 90 TABLET | Refills: 2 | Status: SHIPPED | OUTPATIENT
Start: 2023-03-06

## 2023-03-06 RX ORDER — METOPROLOL TARTRATE 50 MG/1
50 TABLET, FILM COATED ORAL 2 TIMES DAILY
Qty: 180 TABLET | Refills: 2 | Status: SHIPPED | OUTPATIENT
Start: 2023-03-06

## 2023-03-06 RX ORDER — AMITRIPTYLINE HYDROCHLORIDE 50 MG/1
50 TABLET, FILM COATED ORAL NIGHTLY
Qty: 90 TABLET | Refills: 2 | Status: SHIPPED | OUTPATIENT
Start: 2023-03-06

## 2023-03-06 SDOH — ECONOMIC STABILITY: HOUSING INSECURITY
IN THE LAST 12 MONTHS, WAS THERE A TIME WHEN YOU DID NOT HAVE A STEADY PLACE TO SLEEP OR SLEPT IN A SHELTER (INCLUDING NOW)?: NO

## 2023-03-06 SDOH — ECONOMIC STABILITY: FOOD INSECURITY: WITHIN THE PAST 12 MONTHS, THE FOOD YOU BOUGHT JUST DIDN'T LAST AND YOU DIDN'T HAVE MONEY TO GET MORE.: NEVER TRUE

## 2023-03-06 SDOH — ECONOMIC STABILITY: FOOD INSECURITY: WITHIN THE PAST 12 MONTHS, YOU WORRIED THAT YOUR FOOD WOULD RUN OUT BEFORE YOU GOT MONEY TO BUY MORE.: NEVER TRUE

## 2023-03-06 SDOH — ECONOMIC STABILITY: INCOME INSECURITY: HOW HARD IS IT FOR YOU TO PAY FOR THE VERY BASICS LIKE FOOD, HOUSING, MEDICAL CARE, AND HEATING?: NOT HARD AT ALL

## 2023-03-06 ASSESSMENT — PATIENT HEALTH QUESTIONNAIRE - PHQ9
SUM OF ALL RESPONSES TO PHQ QUESTIONS 1-9: 0
2. FEELING DOWN, DEPRESSED OR HOPELESS: 0
SUM OF ALL RESPONSES TO PHQ9 QUESTIONS 1 & 2: 0
SUM OF ALL RESPONSES TO PHQ QUESTIONS 1-9: 0
SUM OF ALL RESPONSES TO PHQ QUESTIONS 1-9: 0
1. LITTLE INTEREST OR PLEASURE IN DOING THINGS: 0
SUM OF ALL RESPONSES TO PHQ QUESTIONS 1-9: 0

## 2023-03-06 ASSESSMENT — ENCOUNTER SYMPTOMS
ABDOMINAL PAIN: 0
SHORTNESS OF BREATH: 0
BACK PAIN: 0

## 2023-03-20 DIAGNOSIS — R79.9 ABNORMAL BLOOD SMEAR: Primary | ICD-10-CM

## 2023-03-20 DIAGNOSIS — D72.829 LEUKOCYTOSIS, UNSPECIFIED TYPE: ICD-10-CM

## 2023-03-21 LAB
BASOPHILS # BLD: 0.9 K/UL (ref 0–0.2)
BASOPHILS NFR BLD: 4 % (ref 0–2)
DIFFERENTIAL METHOD BLD: ABNORMAL
EOSINOPHIL # BLD: 0.4 K/UL (ref 0–0.8)
EOSINOPHIL NFR BLD: 2 % (ref 0.5–7.8)
ERYTHROCYTE [DISTWIDTH] IN BLOOD BY AUTOMATED COUNT: 13.2 % (ref 11.9–14.6)
HCT VFR BLD AUTO: 47.1 % (ref 41.1–50.3)
HGB BLD-MCNC: 15.6 G/DL (ref 13.6–17.2)
IMM GRANULOCYTES # BLD AUTO: 2.5 K/UL (ref 0–0.5)
IMM GRANULOCYTES NFR BLD AUTO: 11 % (ref 0–5)
LYMPHOCYTES # BLD: 3.1 K/UL (ref 0.5–4.6)
LYMPHOCYTES NFR BLD: 14 % (ref 13–44)
MCH RBC QN AUTO: 32.6 PG (ref 26.1–32.9)
MCHC RBC AUTO-ENTMCNC: 33.1 G/DL (ref 31.4–35)
MCV RBC AUTO: 98.3 FL (ref 82–102)
MONOCYTES # BLD: 0.9 K/UL (ref 0.1–1.3)
MONOCYTES NFR BLD: 4 % (ref 4–12)
NEUTS SEG # BLD: 14.6 K/UL (ref 1.7–8.2)
NEUTS SEG NFR BLD: 65 % (ref 43–78)
NRBC # BLD: 0 K/UL (ref 0–0.2)
PATH REV BLD -IMP: NORMAL
PLATELET # BLD AUTO: 345 K/UL (ref 150–450)
PLATELET COMMENT: ADEQUATE
PMV BLD AUTO: 11.1 FL (ref 9.4–12.3)
RBC # BLD AUTO: 4.79 M/UL (ref 4.23–5.6)
RBC MORPH BLD: ABNORMAL
RBC MORPH BLD: ABNORMAL
WBC # BLD AUTO: 22.4 K/UL (ref 4.3–11.1)
WBC MORPH BLD: ABNORMAL

## 2023-03-21 NOTE — PROGRESS NOTES
Ortiz leukocytosis, lack of infectious symptoms and some abnormalities on peripheral smear, referral to hematology placed for which patient is agreeable. 1. Leukocytosis, unspecified type    - CBC with Auto Differential  - Path Review, Smear  - [address] [place] [address] Hematology & Oncology    2.  Abnormal blood smear    - [facility] [address] Hematology & Oncology

## 2023-05-08 NOTE — PROGRESS NOTES
New Patient Abstract    Reason for Referral: Leukocytosis, unspecified type; Abnormal blood smear    Referring Provider: Nany Michael DO    Primary Care Provider: Nany Michael DO    Family History of Cancer/Hematologic Disorders: Family history is significant for mother with skin cancer. Presenting Symptoms: No relevant physical symptoms reported. Narrative with recent with Results/Procedures/Biopsies and Dates completed: Mr. Moses Roque is a 69-year-old white male with a history of anxiety, arthritis, Bipolar 1 disorder, depression, hypercholesterolemia, HTN, prostate hypertrophy, elevated PSA, insomnia, sleep apnea, and prostate biopsy. He presented to his PCP on 3/6/23 for routine follow-up of chronic conditions. Labs drawn on 2/27/23 prior to his appointment included a CBC which was significant for WBC 19.0, abs basophils 0.8, segs abs 11.5, basophils 4, immature granulocytes 10, and abs immature granulocytes 1.9. Patient denied recent corticosteroid use or other symptoms of acute illness. CBC was rechecked and peripheral smear was drawn to evaluate for abnormal morphology and confirm leukocytosis. Repeat CBC drawn on 3/20/23 was significant for WBC 22.4, abs basophils 0.9, segs abs 14.6, basophils 4, immature grand 11, and abs immature grans 2.5. Pathologist review of peripheral smear noted morphologically unremarkable RBC and PLTs. WBCs showed absolute left shifted neutrophilia with basophilia suspicious for a chronic myeloid neoplasm, and BCR-ABL1 testing was recommended. Mr. Moses Roque is now referred to [facility], per primary care, for Hematology evaluation of leukocytosis with lack of infectious symptoms and abnormalities on peripheral smear.       Latest Reference Range & Units 02/25/22 10:31 02/27/23 09:31 03/20/23 13:21   WBC 4.3 - 11.1 K/uL 10.8 19.0 (H) 22.4 (H)   RBC 4.23 - 5.6 M/uL 4.90 4.62 4.79   Hemoglobin Quant 13.6 - 17.2 g/dL 15.9 15.1 15.6   Hematocrit 41.1 - 50.3 % 47.5 46.9 47.1

## 2023-05-09 ENCOUNTER — HOSPITAL ENCOUNTER (OUTPATIENT)
Dept: LAB | Age: 68
Discharge: HOME | End: 2023-05-12
Payer: MEDICARE

## 2023-05-09 ENCOUNTER — OFFICE VISIT (OUTPATIENT)
Dept: ONCOLOGY | Age: 68
End: 2023-05-09
Payer: MEDICARE

## 2023-05-09 VITALS
HEIGHT: 74 IN | OXYGEN SATURATION: 95 % | TEMPERATURE: 97.9 F | HEART RATE: 98 BPM | WEIGHT: 200.3 LBS | SYSTOLIC BLOOD PRESSURE: 129 MMHG | RESPIRATION RATE: 16 BRPM | BODY MASS INDEX: 25.7 KG/M2 | DIASTOLIC BLOOD PRESSURE: 78 MMHG

## 2023-05-09 DIAGNOSIS — D72.829 LEUKOCYTOSIS, UNSPECIFIED TYPE: ICD-10-CM

## 2023-05-09 DIAGNOSIS — D72.829 LEUKOCYTOSIS, UNSPECIFIED TYPE: Primary | ICD-10-CM

## 2023-05-09 LAB
BASOPHILS # BLD: 2.6 K/UL (ref 0–0.2)
BASOPHILS NFR BLD: 6 % (ref 0–2)
DIFFERENTIAL METHOD BLD: ABNORMAL
EOSINOPHIL # BLD: 0.9 K/UL (ref 0–0.8)
EOSINOPHIL NFR BLD: 2 % (ref 0.5–7.8)
ERYTHROCYTE [DISTWIDTH] IN BLOOD BY AUTOMATED COUNT: 14.3 % (ref 11.9–14.6)
HCT VFR BLD AUTO: 46.6 % (ref 41.1–50.3)
HGB BLD-MCNC: 15.3 G/DL (ref 13.6–17.2)
IMM GRANULOCYTES # BLD AUTO: 11 K/UL (ref 0–0.5)
IMM GRANULOCYTES NFR BLD AUTO: 25 % (ref 0–5)
LYMPHOCYTES # BLD: 4 K/UL (ref 0.5–4.6)
LYMPHOCYTES NFR BLD: 9 % (ref 13–44)
Lab: NORMAL
MCH RBC QN AUTO: 31.9 PG (ref 26.1–32.9)
MCHC RBC AUTO-ENTMCNC: 32.8 G/DL (ref 31.4–35)
MCV RBC AUTO: 97.3 FL (ref 82–102)
MONOCYTES # BLD: 1.3 K/UL (ref 0.1–1.3)
MONOCYTES NFR BLD: 3 % (ref 4–12)
NEUTS SEG # BLD: 24.1 K/UL (ref 1.7–8.2)
NEUTS SEG NFR BLD: 55 % (ref 43–78)
NRBC # BLD: 0.04 K/UL (ref 0–0.2)
PLATELET # BLD AUTO: 349 K/UL (ref 150–450)
PLATELET COMMENT: SLIGHT
PMV BLD AUTO: 10.3 FL (ref 9.4–12.3)
RBC # BLD AUTO: 4.79 M/UL (ref 4.23–5.6)
RBC MORPH BLD: ABNORMAL
REFERENCE LAB: NORMAL
REFERENCE LAB: NORMAL
WBC # BLD AUTO: 43.9 K/UL (ref 4.3–11.1)
WBC MORPH BLD: ABNORMAL

## 2023-05-09 PROCEDURE — 85025 COMPLETE CBC W/AUTO DIFF WBC: CPT

## 2023-05-09 PROCEDURE — 3078F DIAST BP <80 MM HG: CPT | Performed by: INTERNAL MEDICINE

## 2023-05-09 PROCEDURE — G8417 CALC BMI ABV UP PARAM F/U: HCPCS | Performed by: INTERNAL MEDICINE

## 2023-05-09 PROCEDURE — 1123F ACP DISCUSS/DSCN MKR DOCD: CPT | Performed by: INTERNAL MEDICINE

## 2023-05-09 PROCEDURE — 36415 COLL VENOUS BLD VENIPUNCTURE: CPT

## 2023-05-09 PROCEDURE — 99204 OFFICE O/P NEW MOD 45 MIN: CPT | Performed by: INTERNAL MEDICINE

## 2023-05-09 PROCEDURE — 3074F SYST BP LT 130 MM HG: CPT | Performed by: INTERNAL MEDICINE

## 2023-05-09 PROCEDURE — 3017F COLORECTAL CA SCREEN DOC REV: CPT | Performed by: INTERNAL MEDICINE

## 2023-05-09 PROCEDURE — 1036F TOBACCO NON-USER: CPT | Performed by: INTERNAL MEDICINE

## 2023-05-09 PROCEDURE — G8428 CUR MEDS NOT DOCUMENT: HCPCS | Performed by: INTERNAL MEDICINE

## 2023-05-09 RX ORDER — LANOLIN ALCOHOL/MO/W.PET/CERES
1000 CREAM (GRAM) TOPICAL DAILY
COMMUNITY

## 2023-05-09 RX ORDER — MAG HYDROX/ALUMINUM HYD/SIMETH 400-400-40
SUSPENSION, ORAL (FINAL DOSE FORM) ORAL
COMMUNITY

## 2023-05-09 RX ORDER — POMEGRAN SD/POMEGRAN FRUIT XT 250 MG
CAPSULE ORAL
COMMUNITY

## 2023-05-09 RX ORDER — VITAMIN E 268 MG
400 CAPSULE ORAL DAILY
COMMUNITY

## 2023-05-09 RX ORDER — ASCORBIC ACID 500 MG
500 TABLET ORAL DAILY
COMMUNITY

## 2023-05-09 RX ORDER — BILBERRY 100 MG
375 CAPSULE ORAL
COMMUNITY

## 2023-05-09 RX ORDER — PYRIDOXINE HCL (VITAMIN B6) 100 MG
TABLET ORAL
COMMUNITY

## 2023-05-09 RX ORDER — BLUEBERRY FLAVOR
60 LIQUID (ML) MISCELLANEOUS
COMMUNITY

## 2023-05-09 ASSESSMENT — PATIENT HEALTH QUESTIONNAIRE - PHQ9
SUM OF ALL RESPONSES TO PHQ9 QUESTIONS 1 & 2: 1
SUM OF ALL RESPONSES TO PHQ QUESTIONS 1-9: 1
SUM OF ALL RESPONSES TO PHQ QUESTIONS 1-9: 1
2. FEELING DOWN, DEPRESSED OR HOPELESS: 1
SUM OF ALL RESPONSES TO PHQ QUESTIONS 1-9: 1
1. LITTLE INTEREST OR PLEASURE IN DOING THINGS: 0
SUM OF ALL RESPONSES TO PHQ QUESTIONS 1-9: 1

## 2023-05-09 NOTE — PATIENT INSTRUCTIONS
Patient Instructions from Today's Visit    Reason for Visit:  New patient        Plan:  -Will draw labs to rule out possibility of leukemia and we will see you in two weeks. Follow Up:  2 weeks    Recent Lab Results:      Treatment Summary has been discussed and given to patient: n/a        -------------------------------------------------------------------------------------------------------------------  Please call our office at [phone] if you have any  of the following symptoms:   Fever of 100.5 or greater  Chills  Shortness of breath  Swelling or pain in one leg    After office hours an answering service is available and will contact a provider for emergencies or if you are experiencing any of the above symptoms. Patient does not express an interest in My Chart. My Chart log in information explained on the after visit summary printout at the [facility] desk.     Nathan Pradhan RN

## 2023-05-09 NOTE — PROGRESS NOTES
[facility] Hematology and Oncology: Office Visit New Patient H & P    Chief Complaint:    Chief Complaint   Patient presents with    New Patient         History of Present Illness:  Mr. Tami Peters is a 76 y.o. male who presents today for evaluation regarding leukocytosis. He presented to his PCP on 3/6/23 for routine follow-up of chronic conditions. Labs drawn on 2/27/23 prior to his appointment included a CBC which was significant for WBC 19.0, abs basophils 0.8, segs abs 11.5, basophils 4, immature granulocytes 10, and abs immature granulocytes 1.9. Patient denied recent corticosteroid use or other symptoms of acute illness. CBC was rechecked and peripheral smear was drawn to evaluate for abnormal morphology and confirm leukocytosis. Repeat CBC drawn on 3/20/23 was significant for WBC 22.4, abs basophils 0.9, segs abs 14.6, basophils 4%, immature granulocytes 11%. PBS showed WBC with absolute left shifted neutrophilia with basophilia suspicious for a chronic myeloid neoplasm, and BCR-ABL1 testing was recommended. Mr. Tami Peters is now referred to [facility], per primary care, for Hematology evaluation of leukocytosis with lack of infectious symptoms and abnormalities on peripheral smear. He denies any active symptoms at this time. Review of Systems:  Constitutional: Negative. HENT: Negative. Eyes: Negative. Respiratory: Negative. Cardiovascular: Negative. Gastrointestinal: Negative. Genitourinary: Negative. Musculoskeletal: Negative. Skin: Negative. Neurological: Negative. Endo/Heme/Allergies: Negative. Psychiatric/Behavioral: Negative. All other systems reviewed and are negative.      No Known Allergies  Past Medical History:   Diagnosis Date    Anxiety     Arthritis     oa    Bipolar 1 disorder ([address]     Depression     Hypercholesterolemia     Hypertension     Prostate hypertrophy     Psychiatric disorder     bipolar disorder, still having lots of problems with mood swings    Sleep

## 2023-05-10 ENCOUNTER — TELEPHONE (OUTPATIENT)
Dept: INTERNAL MEDICINE CLINIC | Facility: CLINIC | Age: 68
End: 2023-05-10

## 2023-05-10 DIAGNOSIS — F43.9 STRESS: ICD-10-CM

## 2023-05-10 DIAGNOSIS — F31.60 BIPOLAR AFFECTIVE DISORDER, CURRENT EPISODE MIXED, CURRENT EPISODE SEVERITY UNSPECIFIED (HCC): Primary | ICD-10-CM

## 2023-05-10 NOTE — TELEPHONE ENCOUNTER
Referral to psychiatry placed for medication management given known bipolar disorder as well as for cognitive behavioral therapy/counseling given possible cancer diagnosis.     Orders Placed This Encounter    External Referral to Psychiatry     Referral Priority:   Urgent     Referral Type:   Eval and Treat     Referral Reason:   Specialty Services Required     Requested Specialty:   Psychiatry     Number of Visits Requested:   1

## 2023-05-10 NOTE — TELEPHONE ENCOUNTER
Pt's spouse called requesting a therapy referral for pt, please due to recent dx of Leukemia at Hematology. She states that you and pt have spoken about it previously in an OV.

## 2023-05-11 LAB
FLOW CYTOMETRY RESULTS: NORMAL
SPECIMEN SOURCE: NORMAL
TEST ORDERED: NORMAL

## 2023-05-18 DIAGNOSIS — D72.829 LEUKOCYTOSIS, UNSPECIFIED TYPE: Primary | ICD-10-CM

## 2023-05-23 ENCOUNTER — OFFICE VISIT (OUTPATIENT)
Dept: ONCOLOGY | Age: 68
End: 2023-05-23
Payer: MEDICARE

## 2023-05-23 ENCOUNTER — HOSPITAL ENCOUNTER (OUTPATIENT)
Dept: LAB | Age: 68
Discharge: HOME | End: 2023-05-26
Payer: MEDICARE

## 2023-05-23 VITALS
OXYGEN SATURATION: 97 % | RESPIRATION RATE: 20 BRPM | SYSTOLIC BLOOD PRESSURE: 120 MMHG | WEIGHT: 201.6 LBS | BODY MASS INDEX: 25.87 KG/M2 | HEART RATE: 89 BPM | DIASTOLIC BLOOD PRESSURE: 80 MMHG | HEIGHT: 74 IN | TEMPERATURE: 98.9 F

## 2023-05-23 DIAGNOSIS — C92.10 CML (CHRONIC MYELOCYTIC LEUKEMIA) (HCC): Primary | ICD-10-CM

## 2023-05-23 DIAGNOSIS — D72.829 LEUKOCYTOSIS, UNSPECIFIED TYPE: ICD-10-CM

## 2023-05-23 LAB
ALBUMIN SERPL-MCNC: 3.9 G/DL (ref 3.2–4.6)
ALBUMIN/GLOB SERPL: 1 (ref 0.4–1.6)
ALP SERPL-CCNC: 63 U/L (ref 50–136)
ALT SERPL-CCNC: 31 U/L (ref 12–65)
ANION GAP SERPL CALC-SCNC: 6 MMOL/L (ref 2–11)
AST SERPL-CCNC: 24 U/L (ref 15–37)
BASOPHILS # BLD: 2.1 K/UL (ref 0–0.2)
BASOPHILS NFR BLD: 5 % (ref 0–2)
BILIRUB SERPL-MCNC: 0.4 MG/DL (ref 0.2–1.1)
BUN SERPL-MCNC: 13 MG/DL (ref 8–23)
CALCIUM SERPL-MCNC: 9.1 MG/DL (ref 8.3–10.4)
CHLORIDE SERPL-SCNC: 104 MMOL/L (ref 101–110)
CO2 SERPL-SCNC: 28 MMOL/L (ref 21–32)
CREAT SERPL-MCNC: 1.1 MG/DL (ref 0.8–1.5)
DIFFERENTIAL METHOD BLD: ABNORMAL
EOSINOPHIL # BLD: 1.3 K/UL (ref 0–0.8)
EOSINOPHIL NFR BLD: 3 % (ref 0.5–7.8)
ERYTHROCYTE [DISTWIDTH] IN BLOOD BY AUTOMATED COUNT: 14.5 % (ref 11.9–14.6)
GLOBULIN SER CALC-MCNC: 4.1 G/DL (ref 2.8–4.5)
GLUCOSE SERPL-MCNC: 117 MG/DL (ref 65–100)
HCT VFR BLD AUTO: 45.1 % (ref 41.1–50.3)
HGB BLD-MCNC: 14.6 G/DL (ref 13.6–17.2)
IMM GRANULOCYTES # BLD AUTO: 10.2 K/UL (ref 0–0.5)
IMM GRANULOCYTES NFR BLD AUTO: 24 % (ref 0–5)
LYMPHOCYTES # BLD: 4.2 K/UL (ref 0.5–4.6)
LYMPHOCYTES NFR BLD: 10 % (ref 13–44)
MCH RBC QN AUTO: 31.4 PG (ref 26.1–32.9)
MCHC RBC AUTO-ENTMCNC: 32.4 G/DL (ref 31.4–35)
MCV RBC AUTO: 97 FL (ref 82–102)
MONOCYTES # BLD: 1.7 K/UL (ref 0.1–1.3)
MONOCYTES NFR BLD: 4 % (ref 4–12)
NEUTS SEG # BLD: 22.9 K/UL (ref 1.7–8.2)
NEUTS SEG NFR BLD: 54 % (ref 43–78)
NRBC # BLD: 0.04 K/UL (ref 0–0.2)
PLATELET # BLD AUTO: 404 K/UL (ref 150–450)
PLATELET COMMENT: ADEQUATE
PMV BLD AUTO: 9.7 FL (ref 9.4–12.3)
POTASSIUM SERPL-SCNC: 3.9 MMOL/L (ref 3.5–5.1)
PROT SERPL-MCNC: 8 G/DL (ref 6.3–8.2)
RBC # BLD AUTO: 4.65 M/UL (ref 4.23–5.6)
RBC MORPH BLD: ABNORMAL
SODIUM SERPL-SCNC: 138 MMOL/L (ref 133–143)
WBC # BLD AUTO: 42.4 K/UL (ref 4.3–11.1)
WBC MORPH BLD: ABNORMAL

## 2023-05-23 PROCEDURE — 3074F SYST BP LT 130 MM HG: CPT | Performed by: INTERNAL MEDICINE

## 2023-05-23 PROCEDURE — 1123F ACP DISCUSS/DSCN MKR DOCD: CPT | Performed by: INTERNAL MEDICINE

## 2023-05-23 PROCEDURE — 99214 OFFICE O/P EST MOD 30 MIN: CPT | Performed by: INTERNAL MEDICINE

## 2023-05-23 PROCEDURE — 1036F TOBACCO NON-USER: CPT | Performed by: INTERNAL MEDICINE

## 2023-05-23 PROCEDURE — 3078F DIAST BP <80 MM HG: CPT | Performed by: INTERNAL MEDICINE

## 2023-05-23 PROCEDURE — 3017F COLORECTAL CA SCREEN DOC REV: CPT | Performed by: INTERNAL MEDICINE

## 2023-05-23 PROCEDURE — 36415 COLL VENOUS BLD VENIPUNCTURE: CPT

## 2023-05-23 PROCEDURE — G8417 CALC BMI ABV UP PARAM F/U: HCPCS | Performed by: INTERNAL MEDICINE

## 2023-05-23 PROCEDURE — G8428 CUR MEDS NOT DOCUMENT: HCPCS | Performed by: INTERNAL MEDICINE

## 2023-05-23 PROCEDURE — 85025 COMPLETE CBC W/AUTO DIFF WBC: CPT

## 2023-05-23 PROCEDURE — 80053 COMPREHEN METABOLIC PANEL: CPT

## 2023-05-23 ASSESSMENT — PATIENT HEALTH QUESTIONNAIRE - PHQ9
SUM OF ALL RESPONSES TO PHQ9 QUESTIONS 1 & 2: 2
SUM OF ALL RESPONSES TO PHQ QUESTIONS 1-9: 2
2. FEELING DOWN, DEPRESSED OR HOPELESS: 2
SUM OF ALL RESPONSES TO PHQ QUESTIONS 1-9: 2
1. LITTLE INTEREST OR PLEASURE IN DOING THINGS: 0
SUM OF ALL RESPONSES TO PHQ QUESTIONS 1-9: 2
SUM OF ALL RESPONSES TO PHQ QUESTIONS 1-9: 2

## 2023-05-23 NOTE — PATIENT INSTRUCTIONS
Patient Instructions from Today's Visit    Reason for Visit:  Follow up-CML    Diagnosis Information:  [email or web address] net/about-us/asco-answers-patient-education-materials/tybl-oxklvgf-nrrt-sheets      Plan: You do have a BCR/ABL mutation in your white cells. We do have a drug that targets this mutation and this cures the CML. The drug is called Gleevec. We would typically check your blood counts to make sure these levels are normalizing. Follow Up:  Dr Faustine Scheuermann in  1 month    Recent Lab Results:  Hospital Outpatient Visit on 05/23/2023   Component Date Value Ref Range Status    WBC 05/23/2023 42.4 (H)  4.3 - 11.1 K/uL Final    PERIPHERAL REVIEW TO FOLLOW    RBC 05/23/2023 4.65  4.23 - 5.6 M/uL Final    Hemoglobin 05/23/2023 14.6  13.6 - 17.2 g/dL Final    Hematocrit 05/23/2023 45.1  41.1 - 50.3 % Final    MCV 05/23/2023 97.0  82.0 - 102.0 FL Final    MCH 05/23/2023 31.4  26.1 - 32.9 PG Final    MCHC 05/23/2023 32.4  31.4 - 35.0 g/dL Final    RDW 05/23/2023 14.5  11.9 - 14.6 % Final    Platelets 35/33/9171 404  150 - 450 K/uL Final    MPV 05/23/2023 9.7  9.4 - 12.3 FL Final    nRBC 05/23/2023 0.04  0.0 - 0.2 K/uL Final    **Note: Absolute NRBC parameter is now reported with Hemogram**    Differential Type 05/23/2023 PENDING   Incomplete    Sodium 05/23/2023 138  133 - 143 mmol/L Final    Potassium 05/23/2023 3.9  3.5 - 5.1 mmol/L Final    Chloride 05/23/2023 104  101 - 110 mmol/L Final    CO2 05/23/2023 28  21 - 32 mmol/L Final    Anion Gap 05/23/2023 6  2 - 11 mmol/L Final    Glucose 05/23/2023 117 (H)  65 - 100 mg/dL Final    BUN 05/23/2023 13  8 - 23 MG/DL Final    Creatinine 05/23/2023 1.10  0.8 - 1.5 MG/DL Final    Est, Glom Filt Rate 05/23/2023 >60  >60 ml/min/1.73m2 Final    Comment:      Pediatric calculator link: José Luis.at. org/professionals/kdoqi/gfr_calculatorped       These results are not intended for use in patients <25years of age.        eGFR results are calculated without a race factor

## 2023-05-23 NOTE — PROGRESS NOTES
PLAN:  Lab studies were personally reviewed. Pertinent old records were reviewed     CML: WBC 22k, predominantly neutrophils but increased immature granulocytes and basophils. BCR/abl and flow cytometry confirm CML. WBC today 42k, Hgb/platelets are normal.  I discussed the pathophysiology and natural history of CML with Mr. Raymond Gonzalez and his wife. I recommend that he start imatinib 400 mg daily. We discussed side effects including GI effects and cytopenias, he understands and agrees to proceed. All questions were asked and answered to the best of my ability. In all, I spent 30 minutes in the care of Mr. Raymond Gonzalez today, over 50% of which was in direct counseling and coordination of care. We will see him again in 4 weeks to review tolerance to [facility] and repeat labs.             Daniel Gotti MD  [facility] Hematology and Oncology  [address] [place] [address]  Office : [phone]  Fax : [phone]

## 2023-05-27 RX ORDER — IMATINIB MESYLATE 400 MG/1
400 TABLET, FILM COATED ORAL DAILY
Qty: 30 TABLET | Refills: 2 | Status: SHIPPED | OUTPATIENT
Start: 2023-05-27

## 2023-06-01 DIAGNOSIS — Z79.899 LONG-TERM CURRENT USE OF BENZODIAZEPINE: ICD-10-CM

## 2023-06-01 DIAGNOSIS — F31.60 BIPOLAR AFFECTIVE DISORDER, CURRENT EPISODE MIXED, CURRENT EPISODE SEVERITY UNSPECIFIED (HCC): ICD-10-CM

## 2023-06-01 RX ORDER — LORAZEPAM 2 MG/1
2 TABLET ORAL EVERY 8 HOURS PRN
Qty: 90 TABLET | Refills: 0 | Status: SHIPPED | OUTPATIENT
Start: 2023-06-05 | End: 2023-07-05

## 2023-06-01 NOTE — TELEPHONE ENCOUNTER
Pt requesting lorazepam refills, please. Scripts last fill date:3/7/23 for a 90 day supply. Pended to fill on 6/5/23. Amitriptyline refills not needed at this time since last refill was sent on 3/6/23 for 90 tabs with 2 refills.

## 2023-06-01 NOTE — TELEPHONE ENCOUNTER
Patient requesting refills on lorazepam.  Has office visit scheduled on 9/6/2023. However patient is supposed to have follow-up 3 months from his last visit with me in March of this year. 1 month prescription sent to pharmacy but patient will need follow-up for future refills given this is a controlled prescription. Orders Placed This Encounter    LORazepam (ATIVAN) 2 MG tablet     Sig: Take 1 tablet by mouth every 8 hours as needed for Anxiety for up to 30 days. Do not drink alcohol, drive or operate heavy machinery after use of this medication as it may cause drowsiness.  Max Daily Amount: 6 mg     Dispense:  90 tablet     Refill:  0

## 2023-06-01 NOTE — TELEPHONE ENCOUNTER
----- Message from Carmella Ball sent at 6/1/2023  9:17 AM EDT -----  Subject: Refill Request    QUESTIONS  Name of Medication? LORazepam (ATIVAN) 2 MG tablet  Patient-reported dosage and instructions? Take 1 tablet by mouth every 8   hours as needed for Anxiety for up to 90 days. How many days do you have left? 0  Preferred Pharmacy? [address]  Pharmacy phone number (if available)? [phone]  ---------------------------------------------------------------------------  --------------,  Name of Medication? amitriptyline (ELAVIL) 50 MG tablet  Patient-reported dosage and instructions? Take 1 tablet daily  How many days do you have left? 0  [address]erred Pharmacy? [address]  Pharmacy phone number (if available)? [phone]  ---------------------------------------------------------------------------  --------------  Tereza GASPAR  What is the best way for the office to contact you? OK to leave message on   voicemail  Preferred Call Back Phone Number? [phone]  ---------------------------------------------------------------------------  --------------  SCRIPT ANSWERS  Relationship to Patient?  Self

## 2023-06-05 ENCOUNTER — TELEPHONE (OUTPATIENT)
Dept: INTERNAL MEDICINE CLINIC | Facility: CLINIC | Age: 68
End: 2023-06-05

## 2023-06-05 NOTE — TELEPHONE ENCOUNTER
Patient called very upset that his refill request from last Thursday had not been addressed. I looked back at his chart and everything was addressed by the clinical staff and Dr. Perla Pryor for his refill. Rebeca Cabrera left him an VM that day regarding this. The patient requested a 90 days supply, but Dr. Perla Pryor noted that he is suppose to be seen every 3 months and his last visit was on 3/6/23. So, Dr. Perla Pryor only put in for a 30-day supply. The patient stated that at his previous visit the [facility] that checked him out set his next visit for 6 months out (September). I apologized for any confusion by the staff and he was not happy with any of it. Was cursing, but apologizing for cursing because he was so frustrated. I reiterated that the Rx had been sent on the day he requested it and that we will have to contact him regarding bringing back in the office this month to accommodate him needing a 3-month follow-up from his last visit on 3/6/23. The patient seemed to hang up. Please, contact the patient to get him in for an office visit for med refills with any provider that is available this month so that he can continue to get his meds until his visit with Dr. Perla Pryor in September.

## 2023-06-20 ENCOUNTER — OFFICE VISIT (OUTPATIENT)
Dept: INTERNAL MEDICINE CLINIC | Facility: CLINIC | Age: 68
End: 2023-06-20
Payer: MEDICARE

## 2023-06-20 VITALS
SYSTOLIC BLOOD PRESSURE: 120 MMHG | TEMPERATURE: 96.6 F | WEIGHT: 198 LBS | OXYGEN SATURATION: 99 % | BODY MASS INDEX: 24.62 KG/M2 | HEART RATE: 99 BPM | DIASTOLIC BLOOD PRESSURE: 82 MMHG | HEIGHT: 75 IN

## 2023-06-20 DIAGNOSIS — I10 PRIMARY HYPERTENSION: ICD-10-CM

## 2023-06-20 DIAGNOSIS — Z79.899 LONG-TERM CURRENT USE OF BENZODIAZEPINE: ICD-10-CM

## 2023-06-20 DIAGNOSIS — F31.60 BIPOLAR AFFECTIVE DISORDER, CURRENT EPISODE MIXED, CURRENT EPISODE SEVERITY UNSPECIFIED (HCC): Primary | ICD-10-CM

## 2023-06-20 DIAGNOSIS — R73.01 IMPAIRED FASTING GLUCOSE: ICD-10-CM

## 2023-06-20 DIAGNOSIS — C92.10 CML (CHRONIC MYELOCYTIC LEUKEMIA) (HCC): ICD-10-CM

## 2023-06-20 DIAGNOSIS — R97.20 ELEVATED PSA: ICD-10-CM

## 2023-06-20 PROCEDURE — G8420 CALC BMI NORM PARAMETERS: HCPCS | Performed by: NURSE PRACTITIONER

## 2023-06-20 PROCEDURE — 3074F SYST BP LT 130 MM HG: CPT | Performed by: NURSE PRACTITIONER

## 2023-06-20 PROCEDURE — 3017F COLORECTAL CA SCREEN DOC REV: CPT | Performed by: NURSE PRACTITIONER

## 2023-06-20 PROCEDURE — 3079F DIAST BP 80-89 MM HG: CPT | Performed by: NURSE PRACTITIONER

## 2023-06-20 PROCEDURE — 1123F ACP DISCUSS/DSCN MKR DOCD: CPT | Performed by: NURSE PRACTITIONER

## 2023-06-20 PROCEDURE — 1036F TOBACCO NON-USER: CPT | Performed by: NURSE PRACTITIONER

## 2023-06-20 PROCEDURE — G8427 DOCREV CUR MEDS BY ELIG CLIN: HCPCS | Performed by: NURSE PRACTITIONER

## 2023-06-20 PROCEDURE — 99214 OFFICE O/P EST MOD 30 MIN: CPT | Performed by: NURSE PRACTITIONER

## 2023-06-20 RX ORDER — LORAZEPAM 2 MG/1
2 TABLET ORAL EVERY 8 HOURS PRN
Qty: 90 TABLET | Refills: 0 | Status: CANCELLED | OUTPATIENT
Start: 2023-06-20 | End: 2023-09-18

## 2023-06-20 RX ORDER — AMITRIPTYLINE HYDROCHLORIDE 50 MG/1
50 TABLET, FILM COATED ORAL NIGHTLY
Qty: 90 TABLET | Refills: 2 | Status: CANCELLED | OUTPATIENT
Start: 2023-06-20

## 2023-06-20 RX ORDER — LORAZEPAM 2 MG/1
2 TABLET ORAL EVERY 8 HOURS PRN
Qty: 270 TABLET | Refills: 0 | Status: SHIPPED | OUTPATIENT
Start: 2023-07-06 | End: 2023-10-04

## 2023-06-20 ASSESSMENT — PATIENT HEALTH QUESTIONNAIRE - PHQ9
5. POOR APPETITE OR OVEREATING: 0
SUM OF ALL RESPONSES TO PHQ QUESTIONS 1-9: 3
1. LITTLE INTEREST OR PLEASURE IN DOING THINGS: 0
SUM OF ALL RESPONSES TO PHQ QUESTIONS 1-9: 3
10. IF YOU CHECKED OFF ANY PROBLEMS, HOW DIFFICULT HAVE THESE PROBLEMS MADE IT FOR YOU TO DO YOUR WORK, TAKE CARE OF THINGS AT HOME, OR GET ALONG WITH OTHER PEOPLE: 0
SUM OF ALL RESPONSES TO PHQ QUESTIONS 1-9: 3
SUM OF ALL RESPONSES TO PHQ9 QUESTIONS 1 & 2: 2
8. MOVING OR SPEAKING SO SLOWLY THAT OTHER PEOPLE COULD HAVE NOTICED. OR THE OPPOSITE, BEING SO FIGETY OR RESTLESS THAT YOU HAVE BEEN MOVING AROUND A LOT MORE THAN USUAL: 0
SUM OF ALL RESPONSES TO PHQ QUESTIONS 1-9: 3
9. THOUGHTS THAT YOU WOULD BE BETTER OFF DEAD, OR OF HURTING YOURSELF: 0
3. TROUBLE FALLING OR STAYING ASLEEP: 1
7. TROUBLE CONCENTRATING ON THINGS, SUCH AS READING THE NEWSPAPER OR WATCHING TELEVISION: 0
6. FEELING BAD ABOUT YOURSELF - OR THAT YOU ARE A FAILURE OR HAVE LET YOURSELF OR YOUR FAMILY DOWN: 0
4. FEELING TIRED OR HAVING LITTLE ENERGY: 0
2. FEELING DOWN, DEPRESSED OR HOPELESS: 2

## 2023-06-20 ASSESSMENT — ENCOUNTER SYMPTOMS
VOMITING: 0
ABDOMINAL PAIN: 0
COUGH: 0
DIARRHEA: 0
SHORTNESS OF BREATH: 0
NAUSEA: 0

## 2023-06-20 NOTE — PROGRESS NOTES
[facility] Primary Care      2023    Patient Name: Jozef Yan  :  1955      Chief Complaint:  Chief Complaint   Patient presents with    Medication Refill     Pt requesting 90 day script for amtriptyline and lorazapem. It is cheaper with insurance to get a 90 day vs 30 day. HPI  Patient presents today for follow-up on chronic conditions. He did not complete fasting lab work prior to today's appt. Recently diagnosed with CML. Being followed by Dr. Devante Mane (hem/onc). When seen last month, he was started on imatinib 400 mg daily. He received the medication but has not yet started taking it. He is upset about the cost of the medication and because of trouble that he had actually obtaining the medication from the pharmacy. Says that he has not started it because he wants to build up a supply first? He is scheduled to return to oncology on 23. Increased anxiety regarding his recent diagnosis. Has been followed by psychiatry in the past for bipolar but not currently seeing psychiatry. He recently started seeing a therapist at [facility]. PHQ9 score today of 6. GAD7 score of 21/21. No SI or self harm. He is agreeable to referral to a new psychiatrist today for further management of his bipolar and anxiety. Past Medical History:   Diagnosis Date    Anxiety     Arthritis     oa    Bipolar 1 disorder ([address]     Depression     Hypercholesterolemia     Hypertension     Prostate hypertrophy     Psychiatric disorder     bipolar disorder, still having lots of problems with mood swings    Sleep apnea        Past Surgical History:   Procedure Laterality Date    UROLOGICAL SURGERY      prostate bx in office       Family History   Problem Relation Age of Onset    Diabetes Father     Cancer Mother         skin       Social History     Tobacco Use    Smoking status: Never     Passive exposure: Past    Smokeless tobacco: Never   Substance Use Topics    Alcohol use: No    Drug use:  Yes

## 2023-06-26 ENCOUNTER — OFFICE VISIT (OUTPATIENT)
Dept: ONCOLOGY | Age: 68
End: 2023-06-26
Payer: MEDICARE

## 2023-06-26 ENCOUNTER — HOSPITAL ENCOUNTER (OUTPATIENT)
Dept: LAB | Age: 68
Discharge: HOME | End: 2023-06-29
Payer: MEDICARE

## 2023-06-26 ENCOUNTER — CLINICAL DOCUMENTATION (OUTPATIENT)
Dept: ONCOLOGY | Age: 68
End: 2023-06-26

## 2023-06-26 VITALS
HEART RATE: 104 BPM | RESPIRATION RATE: 10 BRPM | OXYGEN SATURATION: 97 % | BODY MASS INDEX: 25.41 KG/M2 | SYSTOLIC BLOOD PRESSURE: 144 MMHG | DIASTOLIC BLOOD PRESSURE: 74 MMHG | WEIGHT: 198 LBS | HEIGHT: 74 IN | TEMPERATURE: 98.4 F

## 2023-06-26 DIAGNOSIS — C92.10 CML (CHRONIC MYELOCYTIC LEUKEMIA) (HCC): Primary | ICD-10-CM

## 2023-06-26 DIAGNOSIS — C92.10 CML (CHRONIC MYELOCYTIC LEUKEMIA) (HCC): ICD-10-CM

## 2023-06-26 LAB
ALBUMIN SERPL-MCNC: 4 G/DL (ref 3.2–4.6)
ALBUMIN/GLOB SERPL: 0.9 (ref 0.4–1.6)
ALP SERPL-CCNC: 69 U/L (ref 50–136)
ALT SERPL-CCNC: 29 U/L (ref 12–65)
ANION GAP SERPL CALC-SCNC: 7 MMOL/L (ref 2–11)
AST SERPL-CCNC: 27 U/L (ref 15–37)
BASOPHILS # BLD: 4 K/UL (ref 0–0.2)
BASOPHILS NFR BLD: 6 % (ref 0–2)
BILIRUB SERPL-MCNC: 0.4 MG/DL (ref 0.2–1.1)
BUN SERPL-MCNC: 14 MG/DL (ref 8–23)
CALCIUM SERPL-MCNC: 9.5 MG/DL (ref 8.3–10.4)
CHLORIDE SERPL-SCNC: 107 MMOL/L (ref 101–110)
CO2 SERPL-SCNC: 24 MMOL/L (ref 21–32)
CREAT SERPL-MCNC: 1.1 MG/DL (ref 0.8–1.5)
DIFFERENTIAL METHOD BLD: ABNORMAL
EOSINOPHIL # BLD: 1.3 K/UL (ref 0–0.8)
EOSINOPHIL NFR BLD: 2 % (ref 0.5–7.8)
ERYTHROCYTE [DISTWIDTH] IN BLOOD BY AUTOMATED COUNT: 15 % (ref 11.9–14.6)
GLOBULIN SER CALC-MCNC: 4.3 G/DL (ref 2.8–4.5)
GLUCOSE SERPL-MCNC: 96 MG/DL (ref 65–100)
HCT VFR BLD AUTO: 45.7 % (ref 41.1–50.3)
HGB BLD-MCNC: 15.4 G/DL (ref 13.6–17.2)
IMM GRANULOCYTES # BLD AUTO: 18.2 K/UL (ref 0–0.5)
IMM GRANULOCYTES NFR BLD AUTO: 27 % (ref 0–5)
LYMPHOCYTES # BLD: 6.1 K/UL (ref 0.5–4.6)
LYMPHOCYTES NFR BLD: 9 % (ref 13–44)
MCH RBC QN AUTO: 32.2 PG (ref 26.1–32.9)
MCHC RBC AUTO-ENTMCNC: 33.7 G/DL (ref 31.4–35)
MCV RBC AUTO: 95.6 FL (ref 82–102)
MONOCYTES # BLD: 2 K/UL (ref 0.1–1.3)
MONOCYTES NFR BLD: 3 % (ref 4–12)
NEUTS SEG # BLD: 35.7 K/UL (ref 1.7–8.2)
NEUTS SEG NFR BLD: 53 % (ref 43–78)
NRBC # BLD: 0.18 K/UL (ref 0–0.2)
PLATELET # BLD AUTO: 473 K/UL (ref 150–450)
PLATELET COMMENT: ABNORMAL
PMV BLD AUTO: 9.9 FL (ref 9.4–12.3)
POTASSIUM SERPL-SCNC: 3.6 MMOL/L (ref 3.5–5.1)
PROT SERPL-MCNC: 8.3 G/DL (ref 6.3–8.2)
RBC # BLD AUTO: 4.78 M/UL (ref 4.23–5.6)
RBC MORPH BLD: ABNORMAL
SODIUM SERPL-SCNC: 138 MMOL/L (ref 133–143)
WBC # BLD AUTO: 67.3 K/UL (ref 4.3–11.1)
WBC MORPH BLD: ABNORMAL

## 2023-06-26 PROCEDURE — 36415 COLL VENOUS BLD VENIPUNCTURE: CPT

## 2023-06-26 PROCEDURE — 1123F ACP DISCUSS/DSCN MKR DOCD: CPT | Performed by: INTERNAL MEDICINE

## 2023-06-26 PROCEDURE — 85025 COMPLETE CBC W/AUTO DIFF WBC: CPT

## 2023-06-26 PROCEDURE — G8417 CALC BMI ABV UP PARAM F/U: HCPCS | Performed by: INTERNAL MEDICINE

## 2023-06-26 PROCEDURE — G8428 CUR MEDS NOT DOCUMENT: HCPCS | Performed by: INTERNAL MEDICINE

## 2023-06-26 PROCEDURE — 3078F DIAST BP <80 MM HG: CPT | Performed by: INTERNAL MEDICINE

## 2023-06-26 PROCEDURE — 3017F COLORECTAL CA SCREEN DOC REV: CPT | Performed by: INTERNAL MEDICINE

## 2023-06-26 PROCEDURE — 99214 OFFICE O/P EST MOD 30 MIN: CPT | Performed by: INTERNAL MEDICINE

## 2023-06-26 PROCEDURE — 80053 COMPREHEN METABOLIC PANEL: CPT

## 2023-06-26 PROCEDURE — 3074F SYST BP LT 130 MM HG: CPT | Performed by: INTERNAL MEDICINE

## 2023-06-26 PROCEDURE — 1036F TOBACCO NON-USER: CPT | Performed by: INTERNAL MEDICINE

## 2023-07-07 DIAGNOSIS — F31.60 BIPOLAR AFFECTIVE DISORDER, CURRENT EPISODE MIXED, CURRENT EPISODE SEVERITY UNSPECIFIED (HCC): ICD-10-CM

## 2023-07-07 DIAGNOSIS — Z79.899 LONG-TERM CURRENT USE OF BENZODIAZEPINE: ICD-10-CM

## 2023-07-07 RX ORDER — LORAZEPAM 2 MG/1
2 TABLET ORAL EVERY 8 HOURS PRN
Qty: 270 TABLET | Refills: 0 | Status: SHIPPED | OUTPATIENT
Start: 2023-07-07 | End: 2023-10-05

## 2023-07-07 NOTE — TELEPHONE ENCOUNTER
Patient requesting refill on lorazepam.  Next office visit scheduled for 9/6/2023. PDMP reviewed showing lorazepam 2 mg tablets, quantity #90, 30-day supply last filled on 6/6/2023. Prescription sent to pharmacy on record. Orders Placed This Encounter    LORazepam (ATIVAN) 2 MG tablet     Sig: Take 1 tablet by mouth every 8 hours as needed for Anxiety for up to 90 days. Do not drink alcohol, drive or operate heavy machinery after use of this medication as it may cause drowsiness.  Max Daily Amount: 6 mg     Dispense:  270 tablet     Refill:  0

## 2023-07-25 ENCOUNTER — HOSPITAL ENCOUNTER (OUTPATIENT)
Dept: LAB | Age: 68
Discharge: HOME | End: 2023-07-28
Payer: MEDICARE

## 2023-07-25 ENCOUNTER — OFFICE VISIT (OUTPATIENT)
Dept: ONCOLOGY | Age: 68
End: 2023-07-25
Payer: MEDICARE

## 2023-07-25 VITALS
RESPIRATION RATE: 16 BRPM | HEART RATE: 89 BPM | OXYGEN SATURATION: 96 % | HEIGHT: 74 IN | SYSTOLIC BLOOD PRESSURE: 104 MMHG | DIASTOLIC BLOOD PRESSURE: 73 MMHG | WEIGHT: 199 LBS | BODY MASS INDEX: 25.54 KG/M2 | TEMPERATURE: 98.2 F

## 2023-07-25 DIAGNOSIS — C92.10 CML (CHRONIC MYELOCYTIC LEUKEMIA) (HCC): ICD-10-CM

## 2023-07-25 DIAGNOSIS — F33.1 MAJOR DEPRESSIVE DISORDER, RECURRENT, MODERATE (HCC): ICD-10-CM

## 2023-07-25 DIAGNOSIS — C92.10 CML (CHRONIC MYELOCYTIC LEUKEMIA) (HCC): Primary | ICD-10-CM

## 2023-07-25 PROBLEM — F33.0 MAJOR DEPRESSIVE DISORDER, RECURRENT, MILD (HCC): Status: ACTIVE | Noted: 2023-07-25

## 2023-07-25 PROBLEM — F33.9 MAJOR DEPRESSIVE DISORDER, RECURRENT, UNSPECIFIED (HCC): Status: ACTIVE | Noted: 2023-07-25

## 2023-07-25 LAB
ALBUMIN SERPL-MCNC: 3.8 G/DL (ref 3.2–4.6)
ALBUMIN/GLOB SERPL: 1.1 (ref 0.4–1.6)
ALP SERPL-CCNC: 77 U/L (ref 50–136)
ALT SERPL-CCNC: 25 U/L (ref 12–65)
ANION GAP SERPL CALC-SCNC: 6 MMOL/L (ref 2–11)
AST SERPL-CCNC: 22 U/L (ref 15–37)
BASOPHILS # BLD: 0.1 K/UL (ref 0–0.2)
BASOPHILS NFR BLD: 1 % (ref 0–2)
BILIRUB SERPL-MCNC: 0.8 MG/DL (ref 0.2–1.1)
BUN SERPL-MCNC: 14 MG/DL (ref 8–23)
CALCIUM SERPL-MCNC: 8.6 MG/DL (ref 8.3–10.4)
CHLORIDE SERPL-SCNC: 107 MMOL/L (ref 101–110)
CO2 SERPL-SCNC: 24 MMOL/L (ref 21–32)
CREAT SERPL-MCNC: 1.1 MG/DL (ref 0.8–1.5)
DIFFERENTIAL METHOD BLD: ABNORMAL
EOSINOPHIL # BLD: 0.2 K/UL (ref 0–0.8)
EOSINOPHIL NFR BLD: 3 % (ref 0.5–7.8)
ERYTHROCYTE [DISTWIDTH] IN BLOOD BY AUTOMATED COUNT: 15.9 % (ref 11.9–14.6)
GLOBULIN SER CALC-MCNC: 3.4 G/DL (ref 2.8–4.5)
GLUCOSE SERPL-MCNC: 106 MG/DL (ref 65–100)
HCT VFR BLD AUTO: 38.9 % (ref 41.1–50.3)
HGB BLD-MCNC: 12.9 G/DL (ref 13.6–17.2)
IMM GRANULOCYTES # BLD AUTO: 0 K/UL (ref 0–0.5)
IMM GRANULOCYTES NFR BLD AUTO: 0 % (ref 0–5)
LYMPHOCYTES # BLD: 1 K/UL (ref 0.5–4.6)
LYMPHOCYTES NFR BLD: 20 % (ref 13–44)
MCH RBC QN AUTO: 31.9 PG (ref 26.1–32.9)
MCHC RBC AUTO-ENTMCNC: 33.2 G/DL (ref 31.4–35)
MCV RBC AUTO: 96 FL (ref 82–102)
MONOCYTES # BLD: 0.3 K/UL (ref 0.1–1.3)
MONOCYTES NFR BLD: 6 % (ref 4–12)
NEUTS SEG # BLD: 3.4 K/UL (ref 1.7–8.2)
NEUTS SEG NFR BLD: 70 % (ref 43–78)
NRBC # BLD: 0 K/UL (ref 0–0.2)
PLATELET # BLD AUTO: 152 K/UL (ref 150–450)
PMV BLD AUTO: 9.3 FL (ref 9.4–12.3)
POTASSIUM SERPL-SCNC: 3.5 MMOL/L (ref 3.5–5.1)
PROT SERPL-MCNC: 7.2 G/DL (ref 6.3–8.2)
RBC # BLD AUTO: 4.05 M/UL (ref 4.23–5.6)
SODIUM SERPL-SCNC: 137 MMOL/L (ref 133–143)
WBC # BLD AUTO: 4.9 K/UL (ref 4.3–11.1)

## 2023-07-25 PROCEDURE — 99214 OFFICE O/P EST MOD 30 MIN: CPT | Performed by: INTERNAL MEDICINE

## 2023-07-25 PROCEDURE — 85025 COMPLETE CBC W/AUTO DIFF WBC: CPT

## 2023-07-25 PROCEDURE — G8428 CUR MEDS NOT DOCUMENT: HCPCS | Performed by: INTERNAL MEDICINE

## 2023-07-25 PROCEDURE — 1123F ACP DISCUSS/DSCN MKR DOCD: CPT | Performed by: INTERNAL MEDICINE

## 2023-07-25 PROCEDURE — 80053 COMPREHEN METABOLIC PANEL: CPT

## 2023-07-25 PROCEDURE — 1036F TOBACCO NON-USER: CPT | Performed by: INTERNAL MEDICINE

## 2023-07-25 PROCEDURE — 36415 COLL VENOUS BLD VENIPUNCTURE: CPT

## 2023-07-25 PROCEDURE — 3078F DIAST BP <80 MM HG: CPT | Performed by: INTERNAL MEDICINE

## 2023-07-25 PROCEDURE — 3074F SYST BP LT 130 MM HG: CPT | Performed by: INTERNAL MEDICINE

## 2023-07-25 PROCEDURE — 3017F COLORECTAL CA SCREEN DOC REV: CPT | Performed by: INTERNAL MEDICINE

## 2023-07-25 PROCEDURE — G8417 CALC BMI ABV UP PARAM F/U: HCPCS | Performed by: INTERNAL MEDICINE

## 2023-07-25 RX ORDER — IMATINIB MESYLATE 400 MG/1
400 TABLET, FILM COATED ORAL DAILY
Qty: 30 TABLET | Refills: 5 | Status: SHIPPED | OUTPATIENT
Start: 2023-07-25

## 2023-07-25 NOTE — PROGRESS NOTES
[facility] Hematology and Oncology: Office Visit Established Patient    Chief Complaint:    Chief Complaint   Patient presents with    Follow-up         History of Present Illness:  Mr. Sylvia Quispe is a 76 y.o. male who presents today for follow-up of CML. He presented to his PCP on 3/6/23 for routine follow-up. Labs drawn on 2/27/23 prior to his appointment included a CBC which was significant for WBC 19.0, abs basophils 0.8, segs abs 11.5, basophils 4, immature granulocytes 10, and abs immature granulocytes 1.9. Patient denied recent corticosteroid use or other symptoms of acute illness. CBC was rechecked and peripheral smear was drawn to evaluate for abnormal morphology and confirm leukocytosis. Repeat CBC drawn on 3/20/23 was significant for WBC 22.4, abs basophils 0.9, segs abs 14.6, basophils 4%, immature granulocytes 11%. PBS showed WBC with absolute left shifted neutrophilia with basophilia suspicious for a chronic myeloid neoplasm, and BCR-ABL1 testing was recommended. Mr. Sylvia Quispe was referred to [facility] for hematology evaluation of leukocytosis. We recommended testing including BCR/abl and flow cytometry, this confirmed CML. I recommend that he start imatinib 400 mg daily. We discussed side effects including GI effects and cytopenias, he understands and agrees to proceed. Here for follow-up. He has been on the imatinib about 30 days now. He is tolerating this well. Some intermittent nausea, he is unsure if this is related to timing or eating in conjunction with the medication. His dizziness has improved (not resolved) and he also feels like his thinking is clearer. Review of Systems:  Constitutional: Negative. HENT: Negative. Eyes: Negative. Respiratory: Negative. Cardiovascular: Negative. Gastrointestinal: Positive for nausea (sporadic). Genitourinary: Negative. Musculoskeletal: Negative. Skin: Negative. Neurological: Negative. Endo/Heme/Allergies: Negative.

## 2023-07-25 NOTE — PATIENT INSTRUCTIONS
Patient Instructions from Today's Visit    Reason for Visit:  Follow up CML    Diagnosis Information:  [email or web address] net/about-us/asco-answers-patient-education-materials/liab-nasslbw-ahff-sheets    Plan:  Lab results reviewed     Follow Up:   Follow up in     Recent Lab Results:  Hospital Outpatient Visit on 07/25/2023   Component Date Value Ref Range Status    WBC 07/25/2023 4.9  4.3 - 11.1 K/uL Final    RBC 07/25/2023 4.05 (L)  4.23 - 5.6 M/uL Final    Hemoglobin 07/25/2023 12.9 (L)  13.6 - 17.2 g/dL Final    Hematocrit 07/25/2023 38.9 (L)  41.1 - 50.3 % Final    MCV 07/25/2023 96.0  82.0 - 102.0 FL Final    MCH 07/25/2023 31.9  26.1 - 32.9 PG Final    MCHC 07/25/2023 33.2  31.4 - 35.0 g/dL Final    RDW 07/25/2023 15.9 (H)  11.9 - 14.6 % Final    Platelets 33/69/3124 152  150 - 450 K/uL Final    MPV 07/25/2023 9.3 (L)  9.4 - 12.3 FL Final    nRBC 07/25/2023 0.00  0.0 - 0.2 K/uL Final    **Note: Absolute NRBC parameter is now reported with Hemogram**    Neutrophils % 07/25/2023 70  43 - 78 % Final    Lymphocytes % 07/25/2023 20  13 - 44 % Final    Monocytes % 07/25/2023 6  4.0 - 12.0 % Final    Eosinophils % 07/25/2023 3  0.5 - 7.8 % Final    Basophils % 07/25/2023 1  0.0 - 2.0 % Final    Immature Granulocytes 07/25/2023 0  0.0 - 5.0 % Final    Neutrophils Absolute 07/25/2023 3.4  1.7 - 8.2 K/UL Final    Lymphocytes Absolute 07/25/2023 1.0  0.5 - 4.6 K/UL Final    Monocytes Absolute 07/25/2023 0.3  0.1 - 1.3 K/UL Final    Eosinophils Absolute 07/25/2023 0.2  0.0 - 0.8 K/UL Final    Basophils Absolute 07/25/2023 0.1  0.0 - 0.2 K/UL Final    Absolute Immature Granulocyte 07/25/2023 0.0  0.0 - 0.5 K/UL Final    Differential Type 07/25/2023 AUTOMATED    Final    Sodium 07/25/2023 137  133 - 143 mmol/L Final    Potassium 07/25/2023 3.5  3.5 - 5.1 mmol/L Final    Chloride 07/25/2023 107  101 - 110 mmol/L Final    CO2 07/25/2023 24  21 - 32 mmol/L Final    Anion Gap 07/25/2023 6  2 - 11 mmol/L Final    Glucose

## 2023-08-22 ENCOUNTER — HOSPITAL ENCOUNTER (OUTPATIENT)
Dept: LAB | Age: 68
Discharge: HOME | End: 2023-08-25
Payer: MEDICARE

## 2023-08-22 DIAGNOSIS — C92.10 CML (CHRONIC MYELOCYTIC LEUKEMIA) (HCC): ICD-10-CM

## 2023-08-22 LAB
ALBUMIN SERPL-MCNC: 3.9 G/DL (ref 3.2–4.6)
ALBUMIN/GLOB SERPL: 1.2 (ref 0.4–1.6)
ALP SERPL-CCNC: 86 U/L (ref 50–136)
ALT SERPL-CCNC: 29 U/L (ref 12–65)
ANION GAP SERPL CALC-SCNC: 5 MMOL/L (ref 2–11)
AST SERPL-CCNC: 21 U/L (ref 15–37)
BASOPHILS # BLD: 0 K/UL (ref 0–0.2)
BASOPHILS NFR BLD: 1 % (ref 0–2)
BILIRUB SERPL-MCNC: 0.5 MG/DL (ref 0.2–1.1)
BUN SERPL-MCNC: 12 MG/DL (ref 8–23)
CALCIUM SERPL-MCNC: 8.8 MG/DL (ref 8.3–10.4)
CHLORIDE SERPL-SCNC: 106 MMOL/L (ref 101–110)
CO2 SERPL-SCNC: 29 MMOL/L (ref 21–32)
CREAT SERPL-MCNC: 1.2 MG/DL (ref 0.8–1.5)
DIFFERENTIAL METHOD BLD: ABNORMAL
EOSINOPHIL # BLD: 0.2 K/UL (ref 0–0.8)
EOSINOPHIL NFR BLD: 5 % (ref 0.5–7.8)
ERYTHROCYTE [DISTWIDTH] IN BLOOD BY AUTOMATED COUNT: 16.2 % (ref 11.9–14.6)
GLOBULIN SER CALC-MCNC: 3.2 G/DL (ref 2.8–4.5)
GLUCOSE SERPL-MCNC: 97 MG/DL (ref 65–100)
HCT VFR BLD AUTO: 38.8 % (ref 41.1–50.3)
HGB BLD-MCNC: 12.9 G/DL (ref 13.6–17.2)
IMM GRANULOCYTES # BLD AUTO: 0 K/UL (ref 0–0.5)
IMM GRANULOCYTES NFR BLD AUTO: 0 % (ref 0–5)
LYMPHOCYTES # BLD: 1.2 K/UL (ref 0.5–4.6)
LYMPHOCYTES NFR BLD: 28 % (ref 13–44)
MCH RBC QN AUTO: 33.3 PG (ref 26.1–32.9)
MCHC RBC AUTO-ENTMCNC: 33.2 G/DL (ref 31.4–35)
MCV RBC AUTO: 100.3 FL (ref 82–102)
MONOCYTES # BLD: 0.2 K/UL (ref 0.1–1.3)
MONOCYTES NFR BLD: 5 % (ref 4–12)
NEUTS SEG # BLD: 2.7 K/UL (ref 1.7–8.2)
NEUTS SEG NFR BLD: 62 % (ref 43–78)
NRBC # BLD: 0 K/UL (ref 0–0.2)
PLATELET # BLD AUTO: 146 K/UL (ref 150–450)
PMV BLD AUTO: 9.3 FL (ref 9.4–12.3)
POTASSIUM SERPL-SCNC: 3.9 MMOL/L (ref 3.5–5.1)
PROT SERPL-MCNC: 7.1 G/DL (ref 6.3–8.2)
RBC # BLD AUTO: 3.87 M/UL (ref 4.23–5.6)
SODIUM SERPL-SCNC: 140 MMOL/L (ref 133–143)
WBC # BLD AUTO: 4.4 K/UL (ref 4.3–11.1)

## 2023-08-22 PROCEDURE — 85025 COMPLETE CBC W/AUTO DIFF WBC: CPT

## 2023-08-22 PROCEDURE — 36415 COLL VENOUS BLD VENIPUNCTURE: CPT

## 2023-08-22 PROCEDURE — 80053 COMPREHEN METABOLIC PANEL: CPT

## 2023-09-04 NOTE — PROGRESS NOTES
Dora Lazaro (:  1955) is a 76 y.o. male,Established patient, here for evaluation of the following chief complaint(s):  3 Month Follow-Up, Other (Patient states he has stopped eating since taking his chemo tablet. ), Leukemia, and Diarrhea         ASSESSMENT/PLAN:  1. CML (chronic myelocytic leukemia) ([address])  Peripheral smear on 3/20/2023 with absolute left shifted neutrophilia with basophilia suspicious for a chronic myeloid neoplasm  Positive BCR able 1 translocation on 2023  Flow cytometry with relative left shifted myeloid hyperplasia with 0.1% CD34 positive myeloblasts and relatively increased basophils detected  Continue imatinib per hematology/oncology  However will discuss with oncology patient's significant diarrhea as well as increased morbidity since treatment to see if there are alternative options vs dose reduction     2. Essential hypertension  Continue metoprolol tartrate at current dose given blood pressure controlled in the office today    3. Dyslipidemia  Lipid panel from 2023 reviewed with values at goal  Continue lovastatin    4. Bipolar affective disorder, current episode mixed, current episode severity unspecified ([address])  5. Long-term current use of benzodiazepine  Continue as needed lorazepam   PDMP reviewed showing lorazepam 2 mg tablets, quantity #90, 30-day supply last filled on 2023. Personally called and spoke with pharmacy staff at [facility] on [address] that patient still has prescription that I sent in on 2023 available for fill. I personally called patient after visit today and informed him of this  No longer seeing a therapist  No current suicidal intention or plan  Controlled substance agreement signed by patient and myself during today's visit    6. Chronic insomnia  Continue amitriptyline    - AMB POC DRUG SCREEN, URINE    7.  Chronic diarrhea  Suspect likely side effect from imatinib  Offered stool studies to evaluate for infectious cause although

## 2023-09-06 ENCOUNTER — OFFICE VISIT (OUTPATIENT)
Dept: INTERNAL MEDICINE CLINIC | Facility: CLINIC | Age: 68
End: 2023-09-06
Payer: MEDICARE

## 2023-09-06 VITALS
OXYGEN SATURATION: 97 % | WEIGHT: 197 LBS | HEART RATE: 84 BPM | HEIGHT: 74 IN | DIASTOLIC BLOOD PRESSURE: 60 MMHG | SYSTOLIC BLOOD PRESSURE: 114 MMHG | TEMPERATURE: 97.2 F | BODY MASS INDEX: 25.28 KG/M2

## 2023-09-06 DIAGNOSIS — E78.5 DYSLIPIDEMIA: ICD-10-CM

## 2023-09-06 DIAGNOSIS — C92.10 CML (CHRONIC MYELOCYTIC LEUKEMIA) (HCC): Primary | ICD-10-CM

## 2023-09-06 DIAGNOSIS — K52.9 CHRONIC DIARRHEA: ICD-10-CM

## 2023-09-06 DIAGNOSIS — I10 ESSENTIAL HYPERTENSION: ICD-10-CM

## 2023-09-06 DIAGNOSIS — F31.60 BIPOLAR AFFECTIVE DISORDER, CURRENT EPISODE MIXED, CURRENT EPISODE SEVERITY UNSPECIFIED (HCC): ICD-10-CM

## 2023-09-06 DIAGNOSIS — F51.04 CHRONIC INSOMNIA: ICD-10-CM

## 2023-09-06 DIAGNOSIS — Z79.899 LONG-TERM CURRENT USE OF BENZODIAZEPINE: ICD-10-CM

## 2023-09-06 LAB
11-NOR-9-THC-9-COOH, POC: NEGATIVE
AMPHETAMINE, URINE, POC: NEGATIVE
BENZODIAZEPINES, URINE, POC: NORMAL
BENZOYLECGONINE, URINE, POC: NEGATIVE
METHADONE, URINE, POC: NEGATIVE
METHAMPHETAMINE, URINE, POC: NEGATIVE
MORPHINE, URINE, POC: NORMAL
PHENCYCLIDINE, URINE, POC: NEGATIVE
URINALYSIS COLOR, POC: YELLOW

## 2023-09-06 PROCEDURE — 3074F SYST BP LT 130 MM HG: CPT | Performed by: STUDENT IN AN ORGANIZED HEALTH CARE EDUCATION/TRAINING PROGRAM

## 2023-09-06 PROCEDURE — G8427 DOCREV CUR MEDS BY ELIG CLIN: HCPCS | Performed by: STUDENT IN AN ORGANIZED HEALTH CARE EDUCATION/TRAINING PROGRAM

## 2023-09-06 PROCEDURE — G8417 CALC BMI ABV UP PARAM F/U: HCPCS | Performed by: STUDENT IN AN ORGANIZED HEALTH CARE EDUCATION/TRAINING PROGRAM

## 2023-09-06 PROCEDURE — 1036F TOBACCO NON-USER: CPT | Performed by: STUDENT IN AN ORGANIZED HEALTH CARE EDUCATION/TRAINING PROGRAM

## 2023-09-06 PROCEDURE — 1123F ACP DISCUSS/DSCN MKR DOCD: CPT | Performed by: STUDENT IN AN ORGANIZED HEALTH CARE EDUCATION/TRAINING PROGRAM

## 2023-09-06 PROCEDURE — 99215 OFFICE O/P EST HI 40 MIN: CPT | Performed by: STUDENT IN AN ORGANIZED HEALTH CARE EDUCATION/TRAINING PROGRAM

## 2023-09-06 PROCEDURE — 3078F DIAST BP <80 MM HG: CPT | Performed by: STUDENT IN AN ORGANIZED HEALTH CARE EDUCATION/TRAINING PROGRAM

## 2023-09-06 PROCEDURE — 3017F COLORECTAL CA SCREEN DOC REV: CPT | Performed by: STUDENT IN AN ORGANIZED HEALTH CARE EDUCATION/TRAINING PROGRAM

## 2023-09-06 PROCEDURE — 80305 DRUG TEST PRSMV DIR OPT OBS: CPT | Performed by: STUDENT IN AN ORGANIZED HEALTH CARE EDUCATION/TRAINING PROGRAM

## 2023-09-06 RX ORDER — LOVASTATIN 40 MG/1
40 TABLET ORAL NIGHTLY
Qty: 90 TABLET | Refills: 2 | Status: SHIPPED | OUTPATIENT
Start: 2023-09-06

## 2023-09-06 RX ORDER — METOPROLOL TARTRATE 50 MG/1
50 TABLET, FILM COATED ORAL 2 TIMES DAILY
Qty: 180 TABLET | Refills: 2 | Status: SHIPPED | OUTPATIENT
Start: 2023-09-06

## 2023-09-06 ASSESSMENT — ENCOUNTER SYMPTOMS
BLOOD IN STOOL: 0
ABDOMINAL PAIN: 0
DIARRHEA: 1
ANAL BLEEDING: 0
NAUSEA: 0
VOMITING: 0

## 2023-09-12 ENCOUNTER — HOSPITAL ENCOUNTER (OUTPATIENT)
Dept: LAB | Age: 68
Discharge: HOME | End: 2023-09-15
Payer: MEDICARE

## 2023-09-12 DIAGNOSIS — C92.10 CML (CHRONIC MYELOCYTIC LEUKEMIA) (HCC): ICD-10-CM

## 2023-09-12 LAB
ALBUMIN SERPL-MCNC: 3.7 G/DL (ref 3.2–4.6)
ALBUMIN/GLOB SERPL: 1.2 (ref 0.4–1.6)
ALP SERPL-CCNC: 71 U/L (ref 50–136)
ALT SERPL-CCNC: 23 U/L (ref 12–65)
ANION GAP SERPL CALC-SCNC: 3 MMOL/L (ref 2–11)
AST SERPL-CCNC: 18 U/L (ref 15–37)
BASOPHILS # BLD: 0 K/UL (ref 0–0.2)
BASOPHILS NFR BLD: 1 % (ref 0–2)
BILIRUB SERPL-MCNC: 0.4 MG/DL (ref 0.2–1.1)
BUN SERPL-MCNC: 12 MG/DL (ref 8–23)
CALCIUM SERPL-MCNC: 8.5 MG/DL (ref 8.3–10.4)
CHLORIDE SERPL-SCNC: 107 MMOL/L (ref 101–110)
CO2 SERPL-SCNC: 29 MMOL/L (ref 21–32)
CREAT SERPL-MCNC: 1.1 MG/DL (ref 0.8–1.5)
DIFFERENTIAL METHOD BLD: ABNORMAL
EOSINOPHIL # BLD: 0.2 K/UL (ref 0–0.8)
EOSINOPHIL NFR BLD: 5 % (ref 0.5–7.8)
ERYTHROCYTE [DISTWIDTH] IN BLOOD BY AUTOMATED COUNT: 15.5 % (ref 11.9–14.6)
GLOBULIN SER CALC-MCNC: 3 G/DL (ref 2.8–4.5)
GLUCOSE SERPL-MCNC: 148 MG/DL (ref 65–100)
HCT VFR BLD AUTO: 37.7 % (ref 41.1–50.3)
HGB BLD-MCNC: 12.7 G/DL (ref 13.6–17.2)
IMM GRANULOCYTES # BLD AUTO: 0 K/UL (ref 0–0.5)
IMM GRANULOCYTES NFR BLD AUTO: 0 % (ref 0–5)
LYMPHOCYTES # BLD: 1.3 K/UL (ref 0.5–4.6)
LYMPHOCYTES NFR BLD: 34 % (ref 13–44)
Lab: NORMAL
Lab: NORMAL
MCH RBC QN AUTO: 34.4 PG (ref 26.1–32.9)
MCHC RBC AUTO-ENTMCNC: 33.7 G/DL (ref 31.4–35)
MCV RBC AUTO: 102.2 FL (ref 82–102)
MONOCYTES # BLD: 0.2 K/UL (ref 0.1–1.3)
MONOCYTES NFR BLD: 6 % (ref 4–12)
NEUTS SEG # BLD: 2 K/UL (ref 1.7–8.2)
NEUTS SEG NFR BLD: 54 % (ref 43–78)
NRBC # BLD: 0 K/UL (ref 0–0.2)
PLATELET # BLD AUTO: 182 K/UL (ref 150–450)
PMV BLD AUTO: 8.8 FL (ref 9.4–12.3)
POTASSIUM SERPL-SCNC: 3.6 MMOL/L (ref 3.5–5.1)
PROT SERPL-MCNC: 6.7 G/DL (ref 6.3–8.2)
RBC # BLD AUTO: 3.69 M/UL (ref 4.23–5.6)
REFERENCE LAB: NORMAL
SODIUM SERPL-SCNC: 139 MMOL/L (ref 133–143)
WBC # BLD AUTO: 3.7 K/UL (ref 4.3–11.1)

## 2023-09-12 PROCEDURE — 36415 COLL VENOUS BLD VENIPUNCTURE: CPT

## 2023-09-12 PROCEDURE — 80053 COMPREHEN METABOLIC PANEL: CPT

## 2023-09-12 PROCEDURE — 85025 COMPLETE CBC W/AUTO DIFF WBC: CPT

## 2023-09-19 ENCOUNTER — OFFICE VISIT (OUTPATIENT)
Dept: ONCOLOGY | Age: 68
End: 2023-09-19
Payer: MEDICARE

## 2023-09-19 VITALS
OXYGEN SATURATION: 97 % | WEIGHT: 200.7 LBS | BODY MASS INDEX: 25.76 KG/M2 | HEART RATE: 92 BPM | TEMPERATURE: 98.1 F | RESPIRATION RATE: 12 BRPM | HEIGHT: 74 IN | SYSTOLIC BLOOD PRESSURE: 131 MMHG | DIASTOLIC BLOOD PRESSURE: 74 MMHG

## 2023-09-19 DIAGNOSIS — D61.811 DRUG-INDUCED PANCYTOPENIA (HCC): ICD-10-CM

## 2023-09-19 DIAGNOSIS — K52.1 DIARRHEA DUE TO DRUG: ICD-10-CM

## 2023-09-19 DIAGNOSIS — C92.10 CML (CHRONIC MYELOCYTIC LEUKEMIA) (HCC): Primary | ICD-10-CM

## 2023-09-19 PROCEDURE — 1123F ACP DISCUSS/DSCN MKR DOCD: CPT | Performed by: INTERNAL MEDICINE

## 2023-09-19 PROCEDURE — G8417 CALC BMI ABV UP PARAM F/U: HCPCS | Performed by: INTERNAL MEDICINE

## 2023-09-19 PROCEDURE — 3017F COLORECTAL CA SCREEN DOC REV: CPT | Performed by: INTERNAL MEDICINE

## 2023-09-19 PROCEDURE — G8428 CUR MEDS NOT DOCUMENT: HCPCS | Performed by: INTERNAL MEDICINE

## 2023-09-19 PROCEDURE — 1036F TOBACCO NON-USER: CPT | Performed by: INTERNAL MEDICINE

## 2023-09-19 PROCEDURE — 3075F SYST BP GE 130 - 139MM HG: CPT | Performed by: INTERNAL MEDICINE

## 2023-09-19 PROCEDURE — 3078F DIAST BP <80 MM HG: CPT | Performed by: INTERNAL MEDICINE

## 2023-09-19 PROCEDURE — 99214 OFFICE O/P EST MOD 30 MIN: CPT | Performed by: INTERNAL MEDICINE

## 2023-09-19 ASSESSMENT — PATIENT HEALTH QUESTIONNAIRE - PHQ9
SUM OF ALL RESPONSES TO PHQ QUESTIONS 1-9: 2
SUM OF ALL RESPONSES TO PHQ QUESTIONS 1-9: 2
2. FEELING DOWN, DEPRESSED OR HOPELESS: 2
1. LITTLE INTEREST OR PLEASURE IN DOING THINGS: 0
SUM OF ALL RESPONSES TO PHQ9 QUESTIONS 1 & 2: 2
SUM OF ALL RESPONSES TO PHQ QUESTIONS 1-9: 2
SUM OF ALL RESPONSES TO PHQ QUESTIONS 1-9: 2

## 2023-09-19 NOTE — PATIENT INSTRUCTIONS
answering service is available and will contact a provider for emergencies or if you are experiencing any of the above symptoms. Patient does express an interest in My Chart. My Chart log in information explained on the after visit summary printout at the [address] [employer] desk.     Cristina Shaffer RN

## 2023-11-28 DIAGNOSIS — Z79.899 LONG-TERM CURRENT USE OF BENZODIAZEPINE: ICD-10-CM

## 2023-11-28 DIAGNOSIS — I10 ESSENTIAL HYPERTENSION: ICD-10-CM

## 2023-11-28 DIAGNOSIS — F31.60 BIPOLAR AFFECTIVE DISORDER, CURRENT EPISODE MIXED, CURRENT EPISODE SEVERITY UNSPECIFIED (HCC): ICD-10-CM

## 2023-11-28 DIAGNOSIS — C92.10 CML (CHRONIC MYELOCYTIC LEUKEMIA) (HCC): ICD-10-CM

## 2023-11-28 DIAGNOSIS — F51.04 CHRONIC INSOMNIA: ICD-10-CM

## 2023-11-28 DIAGNOSIS — E78.5 DYSLIPIDEMIA: ICD-10-CM

## 2023-11-28 RX ORDER — IMATINIB MESYLATE 400 MG/1
400 TABLET, FILM COATED ORAL DAILY
Qty: 30 TABLET | Refills: 5 | Status: ACTIVE | OUTPATIENT
Start: 2023-11-28

## 2023-11-28 RX ORDER — LORAZEPAM 2 MG/1
2 TABLET ORAL EVERY 8 HOURS PRN
Qty: 270 TABLET | Refills: 0 | Status: SHIPPED | OUTPATIENT
Start: 2023-11-28 | End: 2024-02-26

## 2023-11-28 RX ORDER — METOPROLOL TARTRATE 50 MG/1
50 TABLET, FILM COATED ORAL 2 TIMES DAILY
Qty: 180 TABLET | Refills: 2 | Status: SHIPPED | OUTPATIENT
Start: 2023-11-28

## 2023-11-28 RX ORDER — LOVASTATIN 40 MG/1
40 TABLET ORAL NIGHTLY
Qty: 90 TABLET | Refills: 2 | Status: SHIPPED | OUTPATIENT
Start: 2023-11-28

## 2023-11-28 RX ORDER — AMITRIPTYLINE HYDROCHLORIDE 50 MG/1
50 TABLET, FILM COATED ORAL NIGHTLY
Qty: 90 TABLET | Refills: 2 | Status: SHIPPED | OUTPATIENT
Start: 2023-11-28

## 2023-11-28 NOTE — TELEPHONE ENCOUNTER
PDMP reviewed showing lorazepam 2 mg tablets, quantity #90, 30-day supply last filled on 9/7/2023. Personally called and spoke with patient to clarify what medications go to mail order versus local pharmacy. Orders Placed This Encounter    LORazepam (ATIVAN) 2 MG tablet     Sig: Take 1 tablet by mouth every 8 hours as needed for Anxiety for up to 90 days. Do not drink alcohol, drive or operate heavy machinery after use of this medication as it may cause drowsiness.  Max Daily Amount: 6 mg     Dispense:  270 tablet     Refill:  0    amitriptyline (ELAVIL) 50 MG tablet     Sig: Take 1 tablet by mouth nightly     Dispense:  90 tablet     Refill:  2    lovastatin (MEVACOR) 40 MG tablet     Sig: Take 1 tablet by mouth nightly     Dispense:  90 tablet     Refill:  2    metoprolol tartrate (LOPRESSOR) 50 MG tablet     Sig: Take 1 tablet by mouth 2 times daily     Dispense:  180 tablet     Refill:  2

## 2023-11-28 NOTE — TELEPHONE ENCOUNTER
Pt called, requ refill of presciptions.     Lovastatin 40 mg, lorazepam 2 mg sent to [address]    Amitriptyline 50 mg, metoprolol 50 mg sent to [facility] on [address] in [place]

## 2023-11-28 NOTE — TELEPHONE ENCOUNTER
Patient is requesting lovastatin, lorazepam, amitriptyline and metoprolol. Per PDMP last fill date on lorazepam: 09/07/2023     Last appointment: 09/06/2023    Next appointment: 03/06/2024    Refill pended for 12/06/2023. Patient should not be out of lovastatin or metoprolol prior to appointment.

## 2023-12-12 ENCOUNTER — HOSPITAL ENCOUNTER (OUTPATIENT)
Dept: LAB | Age: 68
Discharge: HOME | End: 2023-12-15
Payer: MEDICARE

## 2023-12-12 DIAGNOSIS — C92.10 CML (CHRONIC MYELOCYTIC LEUKEMIA) (HCC): ICD-10-CM

## 2023-12-12 LAB
ALBUMIN SERPL-MCNC: 4.2 G/DL (ref 3.2–4.6)
ALBUMIN/GLOB SERPL: 1.2 (ref 0.4–1.6)
ALP SERPL-CCNC: 74 U/L (ref 50–136)
ALT SERPL-CCNC: 35 U/L (ref 12–65)
ANION GAP SERPL CALC-SCNC: 5 MMOL/L (ref 2–11)
AST SERPL-CCNC: 30 U/L (ref 15–37)
BASOPHILS # BLD: 0 K/UL (ref 0–0.2)
BASOPHILS NFR BLD: 1 % (ref 0–2)
BILIRUB SERPL-MCNC: 0.7 MG/DL (ref 0.2–1.1)
BUN SERPL-MCNC: 12 MG/DL (ref 8–23)
CALCIUM SERPL-MCNC: 9 MG/DL (ref 8.3–10.4)
CHLORIDE SERPL-SCNC: 106 MMOL/L (ref 103–113)
CO2 SERPL-SCNC: 28 MMOL/L (ref 21–32)
CREAT SERPL-MCNC: 1.1 MG/DL (ref 0.8–1.5)
DIFFERENTIAL METHOD BLD: ABNORMAL
EOSINOPHIL # BLD: 0.3 K/UL (ref 0–0.8)
EOSINOPHIL NFR BLD: 7 % (ref 0.5–7.8)
ERYTHROCYTE [DISTWIDTH] IN BLOOD BY AUTOMATED COUNT: 13.2 % (ref 11.9–14.6)
GLOBULIN SER CALC-MCNC: 3.5 G/DL (ref 2.8–4.5)
GLUCOSE SERPL-MCNC: 120 MG/DL (ref 65–100)
HCT VFR BLD AUTO: 39.1 % (ref 41.1–50.3)
HGB BLD-MCNC: 13.1 G/DL (ref 13.6–17.2)
IMM GRANULOCYTES # BLD AUTO: 0 K/UL (ref 0–0.5)
IMM GRANULOCYTES NFR BLD AUTO: 0 % (ref 0–5)
LYMPHOCYTES # BLD: 1.7 K/UL (ref 0.5–4.6)
LYMPHOCYTES NFR BLD: 35 % (ref 13–44)
Lab: NORMAL
Lab: NORMAL
MCH RBC QN AUTO: 36 PG (ref 26.1–32.9)
MCHC RBC AUTO-ENTMCNC: 33.5 G/DL (ref 31.4–35)
MCV RBC AUTO: 107.4 FL (ref 82–102)
MONOCYTES # BLD: 0.3 K/UL (ref 0.1–1.3)
MONOCYTES NFR BLD: 6 % (ref 4–12)
NEUTS SEG # BLD: 2.5 K/UL (ref 1.7–8.2)
NEUTS SEG NFR BLD: 52 % (ref 43–78)
NRBC # BLD: 0 K/UL (ref 0–0.2)
PLATELET # BLD AUTO: 218 K/UL (ref 150–450)
PMV BLD AUTO: 9.2 FL (ref 9.4–12.3)
POTASSIUM SERPL-SCNC: 3.7 MMOL/L (ref 3.5–5.1)
PROT SERPL-MCNC: 7.7 G/DL (ref 6.3–8.2)
RBC # BLD AUTO: 3.64 M/UL (ref 4.23–5.6)
REFERENCE LAB: NORMAL
SODIUM SERPL-SCNC: 139 MMOL/L (ref 136–146)
WBC # BLD AUTO: 4.9 K/UL (ref 4.3–11.1)

## 2023-12-12 PROCEDURE — 80053 COMPREHEN METABOLIC PANEL: CPT

## 2023-12-12 PROCEDURE — 85025 COMPLETE CBC W/AUTO DIFF WBC: CPT

## 2023-12-12 PROCEDURE — 36415 COLL VENOUS BLD VENIPUNCTURE: CPT

## 2024-03-03 NOTE — PROGRESS NOTES
Lan Montgomery (:  1955) is a 68 y.o. male,Established patient, here for evaluation of the following chief complaint(s):  3 Month Follow-Up, Medication Refill (Lorazepam and Cream for Poison Ivy), and Diarrhea         ASSESSMENT/PLAN:  1. CML (chronic myelocytic leukemia) (HCC)  Peripheral smear on 3/20/2023 with absolute left shifted neutrophilia with basophilia suspicious for a chronic myeloid neoplasm  Positive BCR able 1 translocation on 2023  Flow cytometry with relative left shifted myeloid hyperplasia with 0.1% CD34 positive myeloblasts and relatively increased basophils detected  Labs from 2023 with positive BCR-ABL 1 translocation (9; 22); percentage BCR-ABL1/ABL1 0.2264  Continue Gleevec per oncology.  Suspect chronic loose stool/diarrhea secondary to medication side effect.  Advised patient okay to use Imodium once a day for symptoms    - CBC with Auto Differential; Future  - Comprehensive Metabolic Panel; Future    2. Bipolar affective disorder, current episode mixed, current episode severity unspecified ([facility])  3. Long-term current use of benzodiazepine  Continue as needed lorazepam.  Also on amitriptyline however primarily for sleep  No longer seeing a therapist   Controlled substance agreement previously signed   PDMP reviewed showing lorazepam 2 mg tablets, quantity #270, 90-day supply last filled on 2023.  New prescription sent to pharmacy on record.    - LORazepam (ATIVAN) 2 MG tablet; Take 1 tablet by mouth every 8 hours as needed for Anxiety for up to 90 days. Do not drink alcohol, drive or operate heavy machinery after use of this medication as it may cause drowsiness. Max Daily Amount: 6 mg  Dispense: 270 tablet; Refill: 0    4. Essential hypertension  Continue metoprolol tartrate     - Lipid Panel; Future  - T4, Free; Future  - TSH; Future  - metoprolol tartrate (LOPRESSOR) 50 MG tablet; Take 1 tablet by mouth 2 times daily  Dispense: 180 tablet; Refill: 2    5.

## 2024-03-06 ENCOUNTER — OFFICE VISIT (OUTPATIENT)
Dept: INTERNAL MEDICINE CLINIC | Facility: CLINIC | Age: 69
End: 2024-03-06
Payer: MEDICARE

## 2024-03-06 VITALS
RESPIRATION RATE: 16 BRPM | DIASTOLIC BLOOD PRESSURE: 70 MMHG | SYSTOLIC BLOOD PRESSURE: 104 MMHG | TEMPERATURE: 98.2 F | HEIGHT: 74 IN | WEIGHT: 197.2 LBS | BODY MASS INDEX: 25.31 KG/M2 | HEART RATE: 88 BPM | OXYGEN SATURATION: 97 %

## 2024-03-06 DIAGNOSIS — I10 ESSENTIAL HYPERTENSION: ICD-10-CM

## 2024-03-06 DIAGNOSIS — R73.03 PREDIABETES: ICD-10-CM

## 2024-03-06 DIAGNOSIS — C92.10 CML (CHRONIC MYELOCYTIC LEUKEMIA) (HCC): Primary | ICD-10-CM

## 2024-03-06 DIAGNOSIS — Z87.2 HISTORY OF CONTACT DERMATITIS: ICD-10-CM

## 2024-03-06 DIAGNOSIS — F51.04 CHRONIC INSOMNIA: ICD-10-CM

## 2024-03-06 DIAGNOSIS — E78.5 DYSLIPIDEMIA: ICD-10-CM

## 2024-03-06 DIAGNOSIS — F31.60 BIPOLAR AFFECTIVE DISORDER, CURRENT EPISODE MIXED, CURRENT EPISODE SEVERITY UNSPECIFIED (HCC): ICD-10-CM

## 2024-03-06 DIAGNOSIS — R97.20 ELEVATED PSA: ICD-10-CM

## 2024-03-06 DIAGNOSIS — Z79.899 LONG-TERM CURRENT USE OF BENZODIAZEPINE: ICD-10-CM

## 2024-03-06 PROCEDURE — G8484 FLU IMMUNIZE NO ADMIN: HCPCS | Performed by: STUDENT IN AN ORGANIZED HEALTH CARE EDUCATION/TRAINING PROGRAM

## 2024-03-06 PROCEDURE — G8417 CALC BMI ABV UP PARAM F/U: HCPCS | Performed by: STUDENT IN AN ORGANIZED HEALTH CARE EDUCATION/TRAINING PROGRAM

## 2024-03-06 PROCEDURE — 3074F SYST BP LT 130 MM HG: CPT | Performed by: STUDENT IN AN ORGANIZED HEALTH CARE EDUCATION/TRAINING PROGRAM

## 2024-03-06 PROCEDURE — 99214 OFFICE O/P EST MOD 30 MIN: CPT | Performed by: STUDENT IN AN ORGANIZED HEALTH CARE EDUCATION/TRAINING PROGRAM

## 2024-03-06 PROCEDURE — 1123F ACP DISCUSS/DSCN MKR DOCD: CPT | Performed by: STUDENT IN AN ORGANIZED HEALTH CARE EDUCATION/TRAINING PROGRAM

## 2024-03-06 PROCEDURE — G8427 DOCREV CUR MEDS BY ELIG CLIN: HCPCS | Performed by: STUDENT IN AN ORGANIZED HEALTH CARE EDUCATION/TRAINING PROGRAM

## 2024-03-06 PROCEDURE — 3017F COLORECTAL CA SCREEN DOC REV: CPT | Performed by: STUDENT IN AN ORGANIZED HEALTH CARE EDUCATION/TRAINING PROGRAM

## 2024-03-06 PROCEDURE — 3078F DIAST BP <80 MM HG: CPT | Performed by: STUDENT IN AN ORGANIZED HEALTH CARE EDUCATION/TRAINING PROGRAM

## 2024-03-06 PROCEDURE — 1036F TOBACCO NON-USER: CPT | Performed by: STUDENT IN AN ORGANIZED HEALTH CARE EDUCATION/TRAINING PROGRAM

## 2024-03-06 RX ORDER — LORAZEPAM 2 MG/1
2 TABLET ORAL EVERY 8 HOURS PRN
Qty: 270 TABLET | Refills: 0 | Status: SHIPPED | OUTPATIENT
Start: 2024-03-06 | End: 2024-06-04

## 2024-03-06 RX ORDER — METOPROLOL TARTRATE 50 MG/1
50 TABLET, FILM COATED ORAL 2 TIMES DAILY
Qty: 180 TABLET | Refills: 2 | Status: SHIPPED | OUTPATIENT
Start: 2024-03-06

## 2024-03-06 RX ORDER — LOVASTATIN 40 MG/1
40 TABLET ORAL NIGHTLY
Qty: 90 TABLET | Refills: 2 | Status: SHIPPED | OUTPATIENT
Start: 2024-03-06

## 2024-03-06 RX ORDER — TRIAMCINOLONE ACETONIDE 1 MG/G
CREAM TOPICAL
Qty: 454 G | Refills: 1 | Status: SHIPPED | OUTPATIENT
Start: 2024-03-06

## 2024-03-06 RX ORDER — AMITRIPTYLINE HYDROCHLORIDE 50 MG/1
50 TABLET, FILM COATED ORAL NIGHTLY
Qty: 90 TABLET | Refills: 2 | Status: SHIPPED | OUTPATIENT
Start: 2024-03-06

## 2024-03-06 ASSESSMENT — PATIENT HEALTH QUESTIONNAIRE - PHQ9
10. IF YOU CHECKED OFF ANY PROBLEMS, HOW DIFFICULT HAVE THESE PROBLEMS MADE IT FOR YOU TO DO YOUR WORK, TAKE CARE OF THINGS AT HOME, OR GET ALONG WITH OTHER PEOPLE: 0
SUM OF ALL RESPONSES TO PHQ QUESTIONS 1-9: 6
SUM OF ALL RESPONSES TO PHQ9 QUESTIONS 1 & 2: 6
6. FEELING BAD ABOUT YOURSELF - OR THAT YOU ARE A FAILURE OR HAVE LET YOURSELF OR YOUR FAMILY DOWN: 0
3. TROUBLE FALLING OR STAYING ASLEEP: 0
SUM OF ALL RESPONSES TO PHQ QUESTIONS 1-9: 6
7. TROUBLE CONCENTRATING ON THINGS, SUCH AS READING THE NEWSPAPER OR WATCHING TELEVISION: 0
2. FEELING DOWN, DEPRESSED OR HOPELESS: 3
SUM OF ALL RESPONSES TO PHQ QUESTIONS 1-9: 6
9. THOUGHTS THAT YOU WOULD BE BETTER OFF DEAD, OR OF HURTING YOURSELF: 0
SUM OF ALL RESPONSES TO PHQ QUESTIONS 1-9: 6
5. POOR APPETITE OR OVEREATING: 0
1. LITTLE INTEREST OR PLEASURE IN DOING THINGS: 3
8. MOVING OR SPEAKING SO SLOWLY THAT OTHER PEOPLE COULD HAVE NOTICED. OR THE OPPOSITE, BEING SO FIGETY OR RESTLESS THAT YOU HAVE BEEN MOVING AROUND A LOT MORE THAN USUAL: 0
4. FEELING TIRED OR HAVING LITTLE ENERGY: 0

## 2024-03-06 ASSESSMENT — ENCOUNTER SYMPTOMS
NAUSEA: 0
VOMITING: 0
DIARRHEA: 1
BLOOD IN STOOL: 0
ANAL BLEEDING: 0

## 2024-03-12 ENCOUNTER — HOSPITAL ENCOUNTER (OUTPATIENT)
Dept: LAB | Age: 69
Discharge: HOME | End: 2024-03-15
Payer: MEDICARE

## 2024-03-12 DIAGNOSIS — C92.10 CML (CHRONIC MYELOCYTIC LEUKEMIA) (HCC): ICD-10-CM

## 2024-03-12 LAB
ALBUMIN SERPL-MCNC: 4 G/DL (ref 3.2–4.6)
ALBUMIN/GLOB SERPL: 1.1 (ref 0.4–1.6)
ALP SERPL-CCNC: 68 U/L (ref 50–136)
ALT SERPL-CCNC: 42 U/L (ref 12–65)
ANION GAP SERPL CALC-SCNC: 6 MMOL/L (ref 2–11)
AST SERPL-CCNC: 34 U/L (ref 15–37)
BASOPHILS # BLD: 0 K/UL (ref 0–0.2)
BASOPHILS NFR BLD: 1 % (ref 0–2)
BILIRUB SERPL-MCNC: 0.7 MG/DL (ref 0.2–1.1)
BUN SERPL-MCNC: 13 MG/DL (ref 8–23)
CALCIUM SERPL-MCNC: 8.8 MG/DL (ref 8.3–10.4)
CHLORIDE SERPL-SCNC: 105 MMOL/L (ref 103–113)
CO2 SERPL-SCNC: 29 MMOL/L (ref 21–32)
CREAT SERPL-MCNC: 1.1 MG/DL (ref 0.8–1.5)
DIFFERENTIAL METHOD BLD: ABNORMAL
EOSINOPHIL # BLD: 0.3 K/UL (ref 0–0.8)
EOSINOPHIL NFR BLD: 8 % (ref 0.5–7.8)
ERYTHROCYTE [DISTWIDTH] IN BLOOD BY AUTOMATED COUNT: 12.6 % (ref 11.9–14.6)
GLOBULIN SER CALC-MCNC: 3.6 G/DL (ref 2.8–4.5)
GLUCOSE SERPL-MCNC: 111 MG/DL (ref 65–100)
HCT VFR BLD AUTO: 39.5 % (ref 41.1–50.3)
HGB BLD-MCNC: 13.2 G/DL (ref 13.6–17.2)
IMM GRANULOCYTES # BLD AUTO: 0 K/UL (ref 0–0.5)
IMM GRANULOCYTES NFR BLD AUTO: 0 % (ref 0–5)
LYMPHOCYTES # BLD: 1.3 K/UL (ref 0.5–4.6)
LYMPHOCYTES NFR BLD: 32 % (ref 13–44)
Lab: NORMAL
Lab: NORMAL
MCH RBC QN AUTO: 36.1 PG (ref 26.1–32.9)
MCHC RBC AUTO-ENTMCNC: 33.4 G/DL (ref 31.4–35)
MCV RBC AUTO: 107.9 FL (ref 82–102)
MONOCYTES # BLD: 0.3 K/UL (ref 0.1–1.3)
MONOCYTES NFR BLD: 8 % (ref 4–12)
NEUTS SEG # BLD: 2.1 K/UL (ref 1.7–8.2)
NEUTS SEG NFR BLD: 51 % (ref 43–78)
NRBC # BLD: 0 K/UL (ref 0–0.2)
PLATELET # BLD AUTO: 193 K/UL (ref 150–450)
PMV BLD AUTO: 9.5 FL (ref 9.4–12.3)
POTASSIUM SERPL-SCNC: 3.9 MMOL/L (ref 3.5–5.1)
PROT SERPL-MCNC: 7.6 G/DL (ref 6.3–8.2)
RBC # BLD AUTO: 3.66 M/UL (ref 4.23–5.6)
REFERENCE LAB: NORMAL
SODIUM SERPL-SCNC: 140 MMOL/L (ref 136–146)
WBC # BLD AUTO: 4.1 K/UL (ref 4.3–11.1)

## 2024-03-12 PROCEDURE — 85025 COMPLETE CBC W/AUTO DIFF WBC: CPT

## 2024-03-12 PROCEDURE — 36415 COLL VENOUS BLD VENIPUNCTURE: CPT

## 2024-03-12 PROCEDURE — 80053 COMPREHEN METABOLIC PANEL: CPT

## 2024-03-19 ENCOUNTER — OFFICE VISIT (OUTPATIENT)
Dept: ONCOLOGY | Age: 69
End: 2024-03-19
Payer: MEDICARE

## 2024-03-19 VITALS
WEIGHT: 198 LBS | HEIGHT: 74 IN | OXYGEN SATURATION: 99 % | SYSTOLIC BLOOD PRESSURE: 125 MMHG | HEART RATE: 80 BPM | TEMPERATURE: 98.9 F | BODY MASS INDEX: 25.41 KG/M2 | DIASTOLIC BLOOD PRESSURE: 77 MMHG | RESPIRATION RATE: 22 BRPM

## 2024-03-19 DIAGNOSIS — C92.10 CML (CHRONIC MYELOCYTIC LEUKEMIA) (HCC): Primary | ICD-10-CM

## 2024-03-19 PROCEDURE — 1036F TOBACCO NON-USER: CPT | Performed by: INTERNAL MEDICINE

## 2024-03-19 PROCEDURE — 99214 OFFICE O/P EST MOD 30 MIN: CPT | Performed by: INTERNAL MEDICINE

## 2024-03-19 PROCEDURE — 3074F SYST BP LT 130 MM HG: CPT | Performed by: INTERNAL MEDICINE

## 2024-03-19 PROCEDURE — G8484 FLU IMMUNIZE NO ADMIN: HCPCS | Performed by: INTERNAL MEDICINE

## 2024-03-19 PROCEDURE — 3078F DIAST BP <80 MM HG: CPT | Performed by: INTERNAL MEDICINE

## 2024-03-19 PROCEDURE — G8417 CALC BMI ABV UP PARAM F/U: HCPCS | Performed by: INTERNAL MEDICINE

## 2024-03-19 PROCEDURE — 3017F COLORECTAL CA SCREEN DOC REV: CPT | Performed by: INTERNAL MEDICINE

## 2024-03-19 PROCEDURE — 1123F ACP DISCUSS/DSCN MKR DOCD: CPT | Performed by: INTERNAL MEDICINE

## 2024-03-19 PROCEDURE — G8428 CUR MEDS NOT DOCUMENT: HCPCS | Performed by: INTERNAL MEDICINE

## 2024-03-19 ASSESSMENT — PATIENT HEALTH QUESTIONNAIRE - PHQ9
SUM OF ALL RESPONSES TO PHQ QUESTIONS 1-9: 1
2. FEELING DOWN, DEPRESSED OR HOPELESS: SEVERAL DAYS
SUM OF ALL RESPONSES TO PHQ9 QUESTIONS 1 & 2: 1
1. LITTLE INTEREST OR PLEASURE IN DOING THINGS: NOT AT ALL
SUM OF ALL RESPONSES TO PHQ QUESTIONS 1-9: 1

## 2024-03-19 NOTE — PROGRESS NOTES
[facility]: Office Visit Established Patient    Chief Complaint:    Chief Complaint   Patient presents with    Follow-up       History of Present Illness:  Mr. Montgomery is a 68 y.o. male who presents today for follow-up of CML.  He presented to his PCP on 3/6/23 for routine follow-up.  Labs drawn on 2/27/23 prior to his appointment included a CBC which was significant for WBC 19.0, abs basophils 0.8, segs abs 11.5, basophils 4, immature granulocytes 10, and abs immature granulocytes 1.9. Patient denied recent corticosteroid use or other symptoms of acute illness. CBC was rechecked and peripheral smear was drawn to evaluate for abnormal morphology and confirm leukocytosis.  Repeat CBC drawn on 3/20/23 was significant for WBC 22.4, abs basophils 0.9, segs abs 14.6, basophils 4%, immature granulocytes 11%.  PBS showed WBC with absolute left shifted neutrophilia with basophilia suspicious for a chronic myeloid neoplasm, and BCR-ABL1 testing was recommended.  Mr. Montgomery was referred to [facility] for hematology evaluation of leukocytosis.  We recommended testing including BCR/abl and flow cytometry, this confirmed CML.  I recommend that he start imatinib 400 mg daily.  We discussed side effects including GI effects and cytopenias, he understands and agrees to proceed.      He is here today for follow up on Gleevec.  He has overall been tolerating Gleevec well.  He does still have random/episodic diarrhea, he does occasionally take Imodium which provides some relief. He is still eating mostly breads and other carbs for nutrition which his stomach tolerates better.  He does have some depression, he has a history of BPD but is not currently on any mood stabilizers.  He does take Elavil.  He denies any other issues or concerns.  He denies any fevers or other infectious symptoms.            Review of Systems:  Constitutional: Negative.   HENT: Negative.   Eyes: Negative.   Respiratory: Negative.  Patient is here for three month follow-up of  Encounter Diagnoses   Name Primary?     Malignant neoplasm of upper-outer quadrant of right breast in female, estrogen receptor positive (H) Yes     Use of tamoxifen (Nolvadex)      Encounter for screening mammogram for malignant neoplasm of breast       Age-related osteoporosis without current pathological fracture    Becca Lewis RN

## 2024-03-19 NOTE — PATIENT INSTRUCTIONS
Patient Information from Today's Visit    Labs and symptoms reviewed. Your labs are doing exactly what we would like them to do. We will move to every 3 months for routine labs and then repeat the BCR-ABL labs in 6 months.    Continue Gleevec.    We recommend following up with your PCP to discuss further management of your depression.    Continue Imodium as needed to help with diarrhea.    Treatment Summary has been discussed and given to patient:N/A  Follow Up: 3 months    Please refer to After Visit Summary or [employer] for upcoming appointment information. If you have any questions regarding your upcoming schedule please reach out to your care team through [employer] or call [phone].  -------------------------------------------------------------------------------------------------------------------  Please call our office at [phone] if you have any  of the following symptoms:   Fever of 100.5 or greater  Chills  Shortness of breath  Swelling or pain in one leg    After office hours an answering service is available and will contact a provider for emergencies or if you are experiencing any of the above symptoms.    Patient did express an interest in My Chart.  My Chart log in information explained on the after visit summary printout at the check-out desk.    ISIDRO SAMPSON RN

## 2024-06-06 ENCOUNTER — NURSE ONLY (OUTPATIENT)
Dept: INTERNAL MEDICINE CLINIC | Facility: CLINIC | Age: 69
End: 2024-06-06

## 2024-06-06 DIAGNOSIS — C92.10 CML (CHRONIC MYELOCYTIC LEUKEMIA) (HCC): ICD-10-CM

## 2024-06-06 DIAGNOSIS — R73.03 PREDIABETES: ICD-10-CM

## 2024-06-06 DIAGNOSIS — I10 ESSENTIAL HYPERTENSION: ICD-10-CM

## 2024-06-06 DIAGNOSIS — R97.20 ELEVATED PSA: ICD-10-CM

## 2024-06-06 DIAGNOSIS — E78.5 DYSLIPIDEMIA: ICD-10-CM

## 2024-06-06 LAB
ALBUMIN SERPL-MCNC: 4 G/DL (ref 3.2–4.6)
ALBUMIN/GLOB SERPL: 1.4 (ref 1–1.9)
ALP SERPL-CCNC: 65 U/L (ref 40–129)
ALT SERPL-CCNC: 33 U/L (ref 12–65)
ANION GAP SERPL CALC-SCNC: 11 MMOL/L (ref 9–18)
AST SERPL-CCNC: 34 U/L (ref 15–37)
BASOPHILS # BLD: 0 K/UL (ref 0–0.2)
BASOPHILS NFR BLD: 1 % (ref 0–2)
BILIRUB SERPL-MCNC: 0.6 MG/DL (ref 0–1.2)
BUN SERPL-MCNC: 12 MG/DL (ref 8–23)
CALCIUM SERPL-MCNC: 9 MG/DL (ref 8.8–10.2)
CHLORIDE SERPL-SCNC: 102 MMOL/L (ref 98–107)
CHOLEST SERPL-MCNC: 132 MG/DL (ref 0–200)
CO2 SERPL-SCNC: 26 MMOL/L (ref 20–28)
CREAT SERPL-MCNC: 1.03 MG/DL (ref 0.8–1.3)
DIFFERENTIAL METHOD BLD: ABNORMAL
EOSINOPHIL # BLD: 0.3 K/UL (ref 0–0.8)
EOSINOPHIL NFR BLD: 7 % (ref 0.5–7.8)
ERYTHROCYTE [DISTWIDTH] IN BLOOD BY AUTOMATED COUNT: 12.9 % (ref 11.9–14.6)
EST. AVERAGE GLUCOSE BLD GHB EST-MCNC: 115 MG/DL
GLOBULIN SER CALC-MCNC: 2.9 G/DL (ref 2.3–3.5)
GLUCOSE SERPL-MCNC: 104 MG/DL (ref 70–99)
HBA1C MFR BLD: 5.6 % (ref 0–5.6)
HCT VFR BLD AUTO: 39.2 % (ref 41.1–50.3)
HDLC SERPL-MCNC: 58 MG/DL (ref 40–60)
HDLC SERPL: 2.3 (ref 0–5)
HGB BLD-MCNC: 13.1 G/DL (ref 13.6–17.2)
IMM GRANULOCYTES # BLD AUTO: 0 K/UL (ref 0–0.5)
IMM GRANULOCYTES NFR BLD AUTO: 0 % (ref 0–5)
LDLC SERPL CALC-MCNC: 58 MG/DL (ref 0–100)
LYMPHOCYTES # BLD: 1.4 K/UL (ref 0.5–4.6)
LYMPHOCYTES NFR BLD: 29 % (ref 13–44)
MCH RBC QN AUTO: 36 PG (ref 26.1–32.9)
MCHC RBC AUTO-ENTMCNC: 33.4 G/DL (ref 31.4–35)
MCV RBC AUTO: 107.7 FL (ref 82–102)
MONOCYTES # BLD: 0.3 K/UL (ref 0.1–1.3)
MONOCYTES NFR BLD: 6 % (ref 4–12)
NEUTS SEG # BLD: 2.9 K/UL (ref 1.7–8.2)
NEUTS SEG NFR BLD: 57 % (ref 43–78)
NRBC # BLD: 0 K/UL (ref 0–0.2)
PLATELET # BLD AUTO: 223 K/UL (ref 150–450)
PMV BLD AUTO: 9.5 FL (ref 9.4–12.3)
POTASSIUM SERPL-SCNC: 4.1 MMOL/L (ref 3.5–5.1)
PROT SERPL-MCNC: 6.9 G/DL (ref 6.3–8.2)
PSA SERPL-MCNC: 6.8 NG/ML (ref 0–4)
RBC # BLD AUTO: 3.64 M/UL (ref 4.23–5.6)
SODIUM SERPL-SCNC: 140 MMOL/L (ref 136–145)
T4 FREE SERPL-MCNC: 1 NG/DL (ref 0.9–1.7)
TRIGL SERPL-MCNC: 83 MG/DL (ref 0–150)
TSH, 3RD GENERATION: 1.05 UIU/ML (ref 0.27–4.2)
VLDLC SERPL CALC-MCNC: 17 MG/DL (ref 6–23)
WBC # BLD AUTO: 4.9 K/UL (ref 4.3–11.1)

## 2024-06-09 ENCOUNTER — PATIENT MESSAGE (OUTPATIENT)
Dept: INTERNAL MEDICINE CLINIC | Facility: CLINIC | Age: 69
End: 2024-06-09

## 2024-06-09 DIAGNOSIS — F31.60 BIPOLAR AFFECTIVE DISORDER, CURRENT EPISODE MIXED, CURRENT EPISODE SEVERITY UNSPECIFIED (HCC): ICD-10-CM

## 2024-06-09 DIAGNOSIS — Z79.899 LONG-TERM CURRENT USE OF BENZODIAZEPINE: ICD-10-CM

## 2024-06-10 RX ORDER — LORAZEPAM 2 MG/1
2 TABLET ORAL EVERY 8 HOURS PRN
Qty: 270 TABLET | Refills: 0 | Status: SHIPPED | OUTPATIENT
Start: 2024-06-11 | End: 2024-09-09

## 2024-06-10 NOTE — TELEPHONE ENCOUNTER
Patient requesting refill on lorazepam.  Last seen in office on 3/6/2024.  Follow-up visit scheduled for 6/14/2024.  Controlled substance agreement previously signed. PDMP reviewed showing lorazepam 2 mg tablets, quantity #270, 90-day supply last filled on 3/13/2024.  Predated prescription sent to pharmacy on record.    Orders Placed This Encounter    LORazepam (ATIVAN) 2 MG tablet     Sig: Take 1 tablet by mouth every 8 hours as needed for Anxiety for up to 90 days. Do not drink alcohol, drive or operate heavy machinery after use of this medication as it may cause drowsiness. DO NOT FILL PRIOR TO 6/11/24. Max Daily Amount: 6 mg     Dispense:  270 tablet     Refill:  0

## 2024-06-10 NOTE — PROGRESS NOTES
Lan Montgomery (:  1955) is a 69 y.o. male,Established patient, here for evaluation of the following chief complaint(s):  Medicare AWV (Pt is here for yearly AWV. ), Anxiety, and Discuss Labs      Assessment & Plan   1. Medicare annual wellness visit, subsequent  Medicare wellness responses reviewed in the office today  PSA elevated at 6.8 on 2024.  Patient with history of elevated PSA but declines urology evaluation at this time  Colonoscopy on 2023, recall in   Labs from 2024 reviewed in the office today    2. CML (chronic myelocytic leukemia) (HCC)  Peripheral smear on 3/20/2023 with absolute left shifted neutrophilia with basophilia suspicious for a chronic myeloid neoplasm  Positive BCR able 1 translocation on 2023  Flow cytometry with relative left shifted myeloid hyperplasia with 0.1% CD34 positive myeloblasts and relatively increased basophils detected  Labs from 2024 with positive BCR-ABL 1 translocation t (9;22) with % BCR-ABL1/ABL1 (IS) 0.0647  Continue Gleevec per oncology.  Prior side effect of loose stool/diarrhea improved with Imodium and diet modification    3. Bipolar affective disorder, current episode mixed, current episode severity unspecified (HCC)  4. Long-term current use of benzodiazepine  PHQ-9 score of 6, BINU-7 score of 5 in the office today  Continue as needed lorazepam. Also on amitriptyline however primarily for sleep   Discussed with patient possible adjustment in therapy to include a maintenance medication but reluctant at this time  Controlled substance agreement previously signed  PDMP reviewed showing lorazepam 2 mg tablets, quantity #270, 90-day supply last filled on 3/13/2024  Recent prescription pended and sent to pharmacy on 2024    5. Essential hypertension  Continue metoprolol tartrate    6. Dyslipidemia  Lipid panel from 2024 with values at goal  Continue current dose of lovastatin    7. Chronic insomnia  Continue

## 2024-06-10 NOTE — TELEPHONE ENCOUNTER
From: Lan Montgomery  To: Dr. Alphonso Frazier  Sent: 6/9/2024 7:02 PM EDT  Subject: lorezepam question    I need a refill but do not see it on the list. It is due to be refilled. Thank you.

## 2024-06-10 NOTE — TELEPHONE ENCOUNTER
Patient is requesting refill on lorazepam, please.     Per PDMP last fill date: 03/13/24 for a 90 day supply     Last appointment: 3/6/24    Next appointment: 6/14/24    Refill pended for 6/11/24.

## 2024-06-14 ENCOUNTER — OFFICE VISIT (OUTPATIENT)
Dept: INTERNAL MEDICINE CLINIC | Facility: CLINIC | Age: 69
End: 2024-06-14
Payer: MEDICARE

## 2024-06-14 VITALS
SYSTOLIC BLOOD PRESSURE: 108 MMHG | DIASTOLIC BLOOD PRESSURE: 68 MMHG | BODY MASS INDEX: 27.08 KG/M2 | OXYGEN SATURATION: 97 % | HEIGHT: 74 IN | WEIGHT: 211 LBS | HEART RATE: 85 BPM | TEMPERATURE: 99 F

## 2024-06-14 DIAGNOSIS — Z00.00 MEDICARE ANNUAL WELLNESS VISIT, SUBSEQUENT: ICD-10-CM

## 2024-06-14 DIAGNOSIS — E78.5 DYSLIPIDEMIA: ICD-10-CM

## 2024-06-14 DIAGNOSIS — F31.60 BIPOLAR AFFECTIVE DISORDER, CURRENT EPISODE MIXED, CURRENT EPISODE SEVERITY UNSPECIFIED (HCC): ICD-10-CM

## 2024-06-14 DIAGNOSIS — R97.20 ELEVATED PSA: ICD-10-CM

## 2024-06-14 DIAGNOSIS — F51.04 CHRONIC INSOMNIA: ICD-10-CM

## 2024-06-14 DIAGNOSIS — Z79.899 LONG-TERM CURRENT USE OF BENZODIAZEPINE: ICD-10-CM

## 2024-06-14 DIAGNOSIS — I10 ESSENTIAL HYPERTENSION: ICD-10-CM

## 2024-06-14 DIAGNOSIS — C92.10 CML (CHRONIC MYELOCYTIC LEUKEMIA) (HCC): Primary | ICD-10-CM

## 2024-06-14 PROCEDURE — 3074F SYST BP LT 130 MM HG: CPT | Performed by: STUDENT IN AN ORGANIZED HEALTH CARE EDUCATION/TRAINING PROGRAM

## 2024-06-14 PROCEDURE — 3078F DIAST BP <80 MM HG: CPT | Performed by: STUDENT IN AN ORGANIZED HEALTH CARE EDUCATION/TRAINING PROGRAM

## 2024-06-14 PROCEDURE — 1123F ACP DISCUSS/DSCN MKR DOCD: CPT | Performed by: STUDENT IN AN ORGANIZED HEALTH CARE EDUCATION/TRAINING PROGRAM

## 2024-06-14 PROCEDURE — 3017F COLORECTAL CA SCREEN DOC REV: CPT | Performed by: STUDENT IN AN ORGANIZED HEALTH CARE EDUCATION/TRAINING PROGRAM

## 2024-06-14 PROCEDURE — G0439 PPPS, SUBSEQ VISIT: HCPCS | Performed by: STUDENT IN AN ORGANIZED HEALTH CARE EDUCATION/TRAINING PROGRAM

## 2024-06-14 SDOH — ECONOMIC STABILITY: HOUSING INSECURITY
IN THE LAST 12 MONTHS, WAS THERE A TIME WHEN YOU DID NOT HAVE A STEADY PLACE TO SLEEP OR SLEPT IN A SHELTER (INCLUDING NOW)?: PATIENT DECLINED

## 2024-06-14 SDOH — ECONOMIC STABILITY: FOOD INSECURITY: WITHIN THE PAST 12 MONTHS, THE FOOD YOU BOUGHT JUST DIDN'T LAST AND YOU DIDN'T HAVE MONEY TO GET MORE.: PATIENT DECLINED

## 2024-06-14 SDOH — ECONOMIC STABILITY: INCOME INSECURITY: HOW HARD IS IT FOR YOU TO PAY FOR THE VERY BASICS LIKE FOOD, HOUSING, MEDICAL CARE, AND HEATING?: PATIENT DECLINED

## 2024-06-14 SDOH — ECONOMIC STABILITY: FOOD INSECURITY: WITHIN THE PAST 12 MONTHS, YOU WORRIED THAT YOUR FOOD WOULD RUN OUT BEFORE YOU GOT MONEY TO BUY MORE.: PATIENT DECLINED

## 2024-06-14 ASSESSMENT — PATIENT HEALTH QUESTIONNAIRE - PHQ9
SUM OF ALL RESPONSES TO PHQ QUESTIONS 1-9: 6
1. LITTLE INTEREST OR PLEASURE IN DOING THINGS: SEVERAL DAYS
3. TROUBLE FALLING OR STAYING ASLEEP: SEVERAL DAYS
8. MOVING OR SPEAKING SO SLOWLY THAT OTHER PEOPLE COULD HAVE NOTICED. OR THE OPPOSITE, BEING SO FIGETY OR RESTLESS THAT YOU HAVE BEEN MOVING AROUND A LOT MORE THAN USUAL: NOT AT ALL
SUM OF ALL RESPONSES TO PHQ9 QUESTIONS 1 & 2: 2
6. FEELING BAD ABOUT YOURSELF - OR THAT YOU ARE A FAILURE OR HAVE LET YOURSELF OR YOUR FAMILY DOWN: SEVERAL DAYS
7. TROUBLE CONCENTRATING ON THINGS, SUCH AS READING THE NEWSPAPER OR WATCHING TELEVISION: SEVERAL DAYS
9. THOUGHTS THAT YOU WOULD BE BETTER OFF DEAD, OR OF HURTING YOURSELF: NOT AT ALL
4. FEELING TIRED OR HAVING LITTLE ENERGY: SEVERAL DAYS
SUM OF ALL RESPONSES TO PHQ QUESTIONS 1-9: 6
2. FEELING DOWN, DEPRESSED OR HOPELESS: SEVERAL DAYS
SUM OF ALL RESPONSES TO PHQ QUESTIONS 1-9: 6
SUM OF ALL RESPONSES TO PHQ QUESTIONS 1-9: 6
5. POOR APPETITE OR OVEREATING: NOT AT ALL
10. IF YOU CHECKED OFF ANY PROBLEMS, HOW DIFFICULT HAVE THESE PROBLEMS MADE IT FOR YOU TO DO YOUR WORK, TAKE CARE OF THINGS AT HOME, OR GET ALONG WITH OTHER PEOPLE: SOMEWHAT DIFFICULT

## 2024-06-14 ASSESSMENT — LIFESTYLE VARIABLES
HOW OFTEN DO YOU HAVE A DRINK CONTAINING ALCOHOL: NEVER
HOW MANY STANDARD DRINKS CONTAINING ALCOHOL DO YOU HAVE ON A TYPICAL DAY: PATIENT DOES NOT DRINK

## 2024-06-14 ASSESSMENT — ENCOUNTER SYMPTOMS
NAUSEA: 0
SHORTNESS OF BREATH: 0
BLOOD IN STOOL: 0
ABDOMINAL PAIN: 0
ANAL BLEEDING: 0
COUGH: 0

## 2024-06-14 NOTE — PATIENT INSTRUCTIONS
school.  What can you do to manage stress?  You can try these things to help manage stress:   Do something active. Exercise or activity can help reduce stress. Walking is a great way to get started. Even everyday activities such as housecleaning or yard work can help.  Try yoga or audie chi. These techniques combine exercise and meditation. You may need some training at first to learn them.  Do something you enjoy. For example, listen to music or go to a movie. Practice your hobby or do volunteer work.  Meditate. This can help you relax, because you are not worrying about what happened before or what may happen in the future.  Do guided imagery. Imagine yourself in any setting that helps you feel calm. You can use online videos, books, or a teacher to guide you.  Do breathing exercises. For example:  From a standing position, bend forward from the waist with your knees slightly bent. Let your arms dangle close to the floor.  Breathe in slowly and deeply as you return to a standing position. Roll up slowly and lift your head last.  Hold your breath for just a few seconds in the standing position.  Breathe out slowly and bend forward from the waist.  Let your feelings out. Talk, laugh, cry, and express anger when you need to. Talking with supportive friends or family, a counselor, or a [employer]rachita leader about your feelings is a healthy way to relieve stress. Avoid discussing your feelings with people who make you feel worse.  Write. It may help to write about things that are bothering you. This helps you find out how much stress you feel and what is causing it. When you know this, you can find better ways to cope.  What can you do to prevent stress?  You might try some of these things to help prevent stress:  Manage your time. This helps you find time to do the things you want and need to do.  Get enough sleep. Your body recovers from the stresses of the day while you are sleeping.  Get support. Your family, friends, and

## 2024-06-19 ENCOUNTER — OFFICE VISIT (OUTPATIENT)
Dept: ONCOLOGY | Age: 69
End: 2024-06-19
Payer: MEDICARE

## 2024-06-19 ENCOUNTER — HOSPITAL ENCOUNTER (OUTPATIENT)
Dept: LAB | Age: 69
Discharge: HOME | End: 2024-06-22
Payer: MEDICARE

## 2024-06-19 VITALS
HEIGHT: 74 IN | WEIGHT: 201.5 LBS | SYSTOLIC BLOOD PRESSURE: 148 MMHG | RESPIRATION RATE: 18 BRPM | TEMPERATURE: 97.9 F | BODY MASS INDEX: 25.86 KG/M2 | HEART RATE: 87 BPM | OXYGEN SATURATION: 98 % | DIASTOLIC BLOOD PRESSURE: 77 MMHG

## 2024-06-19 DIAGNOSIS — C92.10 CML (CHRONIC MYELOCYTIC LEUKEMIA) (HCC): ICD-10-CM

## 2024-06-19 DIAGNOSIS — R97.20 ELEVATED PSA: ICD-10-CM

## 2024-06-19 DIAGNOSIS — I10 PRIMARY HYPERTENSION: ICD-10-CM

## 2024-06-19 DIAGNOSIS — C92.10 CML (CHRONIC MYELOCYTIC LEUKEMIA) (HCC): Primary | ICD-10-CM

## 2024-06-19 DIAGNOSIS — D61.811 DRUG-INDUCED PANCYTOPENIA (HCC): ICD-10-CM

## 2024-06-19 DIAGNOSIS — K52.1 DIARRHEA DUE TO DRUG: ICD-10-CM

## 2024-06-19 DIAGNOSIS — R73.01 IMPAIRED FASTING GLUCOSE: ICD-10-CM

## 2024-06-19 DIAGNOSIS — E78.5 DYSLIPIDEMIA: ICD-10-CM

## 2024-06-19 DIAGNOSIS — R73.03 PREDIABETES: ICD-10-CM

## 2024-06-19 DIAGNOSIS — I10 ESSENTIAL HYPERTENSION: Primary | ICD-10-CM

## 2024-06-19 LAB
ALBUMIN SERPL-MCNC: 4.1 G/DL (ref 3.2–4.6)
ALBUMIN/GLOB SERPL: 1.5 (ref 1–1.9)
ALP SERPL-CCNC: 61 U/L (ref 40–129)
ALT SERPL-CCNC: 33 U/L (ref 12–65)
ANION GAP SERPL CALC-SCNC: 11 MMOL/L (ref 9–18)
AST SERPL-CCNC: 37 U/L (ref 15–37)
BASOPHILS # BLD: 0 K/UL (ref 0–0.2)
BASOPHILS NFR BLD: 1 % (ref 0–2)
BILIRUB SERPL-MCNC: 0.6 MG/DL (ref 0–1.2)
BUN SERPL-MCNC: 11 MG/DL (ref 8–23)
CALCIUM SERPL-MCNC: 8.8 MG/DL (ref 8.8–10.2)
CHLORIDE SERPL-SCNC: 102 MMOL/L (ref 98–107)
CO2 SERPL-SCNC: 26 MMOL/L (ref 20–28)
CREAT SERPL-MCNC: 1.05 MG/DL (ref 0.8–1.3)
DIFFERENTIAL METHOD BLD: ABNORMAL
EOSINOPHIL # BLD: 0.3 K/UL (ref 0–0.8)
EOSINOPHIL NFR BLD: 6 % (ref 0.5–7.8)
ERYTHROCYTE [DISTWIDTH] IN BLOOD BY AUTOMATED COUNT: 12.9 % (ref 11.9–14.6)
GLOBULIN SER CALC-MCNC: 2.8 G/DL (ref 2.3–3.5)
GLUCOSE SERPL-MCNC: 101 MG/DL (ref 70–99)
HCT VFR BLD AUTO: 36.5 % (ref 41.1–50.3)
HGB BLD-MCNC: 12.2 G/DL (ref 13.6–17.2)
IMM GRANULOCYTES # BLD AUTO: 0 K/UL (ref 0–0.5)
IMM GRANULOCYTES NFR BLD AUTO: 0 % (ref 0–5)
LYMPHOCYTES # BLD: 1.8 K/UL (ref 0.5–4.6)
LYMPHOCYTES NFR BLD: 36 % (ref 13–44)
MCH RBC QN AUTO: 35.5 PG (ref 26.1–32.9)
MCHC RBC AUTO-ENTMCNC: 33.4 G/DL (ref 31.4–35)
MCV RBC AUTO: 106.1 FL (ref 82–102)
MONOCYTES # BLD: 0.3 K/UL (ref 0.1–1.3)
MONOCYTES NFR BLD: 6 % (ref 4–12)
NEUTS SEG # BLD: 2.6 K/UL (ref 1.7–8.2)
NEUTS SEG NFR BLD: 51 % (ref 43–78)
NRBC # BLD: 0 K/UL (ref 0–0.2)
PLATELET # BLD AUTO: 208 K/UL (ref 150–450)
PMV BLD AUTO: 9 FL (ref 9.4–12.3)
POTASSIUM SERPL-SCNC: 4 MMOL/L (ref 3.5–5.1)
PROT SERPL-MCNC: 6.9 G/DL (ref 6.3–8.2)
RBC # BLD AUTO: 3.44 M/UL (ref 4.23–5.6)
SODIUM SERPL-SCNC: 139 MMOL/L (ref 136–145)
WBC # BLD AUTO: 5.1 K/UL (ref 4.3–11.1)

## 2024-06-19 PROCEDURE — G8417 CALC BMI ABV UP PARAM F/U: HCPCS | Performed by: NURSE PRACTITIONER

## 2024-06-19 PROCEDURE — 85025 COMPLETE CBC W/AUTO DIFF WBC: CPT

## 2024-06-19 PROCEDURE — 99214 OFFICE O/P EST MOD 30 MIN: CPT | Performed by: NURSE PRACTITIONER

## 2024-06-19 PROCEDURE — 1036F TOBACCO NON-USER: CPT | Performed by: NURSE PRACTITIONER

## 2024-06-19 PROCEDURE — 1123F ACP DISCUSS/DSCN MKR DOCD: CPT | Performed by: NURSE PRACTITIONER

## 2024-06-19 PROCEDURE — 3017F COLORECTAL CA SCREEN DOC REV: CPT | Performed by: NURSE PRACTITIONER

## 2024-06-19 PROCEDURE — 3078F DIAST BP <80 MM HG: CPT | Performed by: NURSE PRACTITIONER

## 2024-06-19 PROCEDURE — G8427 DOCREV CUR MEDS BY ELIG CLIN: HCPCS | Performed by: NURSE PRACTITIONER

## 2024-06-19 PROCEDURE — 36415 COLL VENOUS BLD VENIPUNCTURE: CPT

## 2024-06-19 PROCEDURE — 80053 COMPREHEN METABOLIC PANEL: CPT

## 2024-06-19 PROCEDURE — 3077F SYST BP >= 140 MM HG: CPT | Performed by: NURSE PRACTITIONER

## 2024-06-19 RX ORDER — IMATINIB MESYLATE 400 MG/1
400 TABLET, FILM COATED ORAL DAILY
Qty: 30 TABLET | Refills: 5 | Status: ACTIVE | OUTPATIENT
Start: 2024-06-19

## 2024-06-19 NOTE — PATIENT INSTRUCTIONS
Hospital Outpatient Visit on 06/19/2024   Component Date Value Ref Range Status    Sodium 06/19/2024 139  136 - 145 mmol/L Final    Potassium 06/19/2024 4.0  3.5 - 5.1 mmol/L Final    Chloride 06/19/2024 102  98 - 107 mmol/L Final    CO2 06/19/2024 26  20 - 28 mmol/L Final    Anion Gap 06/19/2024 11  9 - 18 mmol/L Final    Glucose 06/19/2024 101 (H)  70 - 99 mg/dL Final    Comment: <70 mg/dL Consistent with, but not fully diagnostic of hypoglycemia.  100 - 125 mg/dL Impaired fasting glucose/consistent with pre-diabetes mellitus.  > 126 mg/dl Fasting glucose consistent with overt diabetes mellitus      BUN 06/19/2024 11  8 - 23 MG/DL Final    Creatinine 06/19/2024 1.05  0.80 - 1.30 MG/DL Final    Est, Glom Filt Rate 06/19/2024 77  >60 ml/min/1.73m2 Final    Comment:    Pediatric calculator link: https://www.kidney.org/professionals/kdoqi/gfr_calculatorped     These results are not intended for use in patients <18 years of age.     eGFR results are calculated without a race factor using  the 2021 CKD-EPI equation. Careful clinical correlation is recommended, particularly when comparing to results calculated using previous equations.  The CKD-EPI equation is less accurate in patients with extremes of muscle mass, extra-renal metabolism of creatinine, excessive creatine ingestion, or following therapy that affects renal tubular secretion.      Calcium 06/19/2024 8.8  8.8 - 10.2 MG/DL Final    Total Bilirubin 06/19/2024 0.6  0.0 - 1.2 MG/DL Final    ALT 06/19/2024 33  12 - 65 U/L Final    AST 06/19/2024 37  15 - 37 U/L Final    Alk Phosphatase 06/19/2024 61  40 - 129 U/L Final    Total Protein 06/19/2024 6.9  6.3 - 8.2 g/dL Final    Albumin 06/19/2024 4.1  3.2 - 4.6 g/dL Final    Globulin 06/19/2024 2.8  2.3 - 3.5 g/dL Final    Albumin/Globulin Ratio 06/19/2024 1.5  1.0 - 1.9   Final    WBC 06/19/2024 5.1  4.3 - 11.1 K/uL Final    RBC 06/19/2024 3.44 (L)  4.23 - 5.6 M/uL Final    Hemoglobin 06/19/2024 12.2 (L)  13.6 -

## 2024-06-19 NOTE — PROGRESS NOTES
[facility]: Office Visit Established Patient    Chief Complaint:    No chief complaint on file.      History of Present Illness:  Mr. Montgomery is a 69 y.o. male who presents today for follow-up of CML.  He presented to his PCP on 3/6/23 for routine follow-up.  Labs drawn on 2/27/23 prior to his appointment included a CBC which was significant for WBC 19.0, abs basophils 0.8, segs abs 11.5, basophils 4, immature granulocytes 10, and abs immature granulocytes 1.9. Patient denied recent corticosteroid use or other symptoms of acute illness. CBC was rechecked and peripheral smear was drawn to evaluate for abnormal morphology and confirm leukocytosis.  Repeat CBC drawn on 3/20/23 was significant for WBC 22.4, abs basophils 0.9, segs abs 14.6, basophils 4%, immature granulocytes 11%.  PBS showed WBC with absolute left shifted neutrophilia with basophilia suspicious for a chronic myeloid neoplasm, and BCR-ABL1 testing was recommended.  Mr. Montgomery was referred to [facility] for hematology evaluation of leukocytosis.  We recommended testing including BCR/abl and flow cytometry, this confirmed CML.  I recommend that he start imatinib 400 mg daily.  We discussed side effects including GI effects and cytopenias, he understands and agrees to proceed.       He returns today for follow up on Gleevec.  Since last seen he has been well overall.  There is no nausea and his intermittent diarrhea is ongoing.  He continues to have episodes of food cravings, mostly carbs.  In regards to fatigue he reports low stamina and attributes it to stress and summer weather.  There are no respiratory symptoms or edema.  He denies any fevers, infectious symptoms, or night sweats.           Review of Systems:  Constitutional: Negative.   HENT: Negative.   Eyes: Negative.   Respiratory: Negative.   Cardiovascular: Negative.   Gastrointestinal: Positive for diarrhea (sporadic).   Genitourinary: Negative.   Musculoskeletal:

## 2024-09-10 ENCOUNTER — PATIENT MESSAGE (OUTPATIENT)
Dept: INTERNAL MEDICINE CLINIC | Facility: CLINIC | Age: 69
End: 2024-09-10

## 2024-09-10 DIAGNOSIS — F31.60 BIPOLAR AFFECTIVE DISORDER, CURRENT EPISODE MIXED, CURRENT EPISODE SEVERITY UNSPECIFIED (HCC): Primary | ICD-10-CM

## 2024-09-10 DIAGNOSIS — Z79.899 LONG-TERM CURRENT USE OF BENZODIAZEPINE: ICD-10-CM

## 2024-09-10 RX ORDER — LORAZEPAM 2 MG/1
2 TABLET ORAL EVERY 8 HOURS PRN
Qty: 270 TABLET | Refills: 0 | Status: SHIPPED | OUTPATIENT
Start: 2024-09-11 | End: 2024-12-10

## 2024-09-13 ENCOUNTER — HOSPITAL ENCOUNTER (OUTPATIENT)
Dept: LAB | Age: 69
Discharge: HOME | End: 2024-09-16
Payer: MEDICARE

## 2024-09-13 DIAGNOSIS — R97.20 ELEVATED PSA: ICD-10-CM

## 2024-09-13 DIAGNOSIS — C92.10 CML (CHRONIC MYELOCYTIC LEUKEMIA) (HCC): ICD-10-CM

## 2024-09-13 LAB
ALBUMIN SERPL-MCNC: 3.9 G/DL (ref 3.2–4.6)
ALBUMIN/GLOB SERPL: 1.4 (ref 1–1.9)
ALP SERPL-CCNC: 67 U/L (ref 40–129)
ALT SERPL-CCNC: 25 U/L (ref 12–65)
ANION GAP SERPL CALC-SCNC: 10 MMOL/L (ref 9–18)
AST SERPL-CCNC: 36 U/L (ref 15–37)
BASOPHILS # BLD: 0 K/UL (ref 0–0.2)
BASOPHILS NFR BLD: 1 % (ref 0–2)
BILIRUB SERPL-MCNC: 0.8 MG/DL (ref 0–1.2)
BUN SERPL-MCNC: 10 MG/DL (ref 8–23)
CALCIUM SERPL-MCNC: 9 MG/DL (ref 8.8–10.2)
CHLORIDE SERPL-SCNC: 102 MMOL/L (ref 98–107)
CO2 SERPL-SCNC: 28 MMOL/L (ref 20–28)
COLLECTION INFORMATION: NORMAL
CREAT SERPL-MCNC: 1.03 MG/DL (ref 0.8–1.3)
DATE SENT TO REF LAB: NORMAL
DIFFERENTIAL METHOD BLD: ABNORMAL
EOSINOPHIL # BLD: 0.4 K/UL (ref 0–0.8)
EOSINOPHIL NFR BLD: 8 % (ref 0.5–7.8)
ERYTHROCYTE [DISTWIDTH] IN BLOOD BY AUTOMATED COUNT: 13.7 % (ref 11.9–14.6)
GLOBULIN SER CALC-MCNC: 2.8 G/DL (ref 2.3–3.5)
GLUCOSE SERPL-MCNC: 110 MG/DL (ref 70–99)
HCT VFR BLD AUTO: 37.2 % (ref 41.1–50.3)
HGB BLD-MCNC: 12.4 G/DL (ref 13.6–17.2)
IMM GRANULOCYTES # BLD AUTO: 0 K/UL (ref 0–0.5)
IMM GRANULOCYTES NFR BLD AUTO: 0 % (ref 0–5)
LYMPHOCYTES # BLD: 1.8 K/UL (ref 0.5–4.6)
LYMPHOCYTES NFR BLD: 39 % (ref 13–44)
Lab: NORMAL
MCH RBC QN AUTO: 36.5 PG (ref 26.1–32.9)
MCHC RBC AUTO-ENTMCNC: 33.3 G/DL (ref 31.4–35)
MCV RBC AUTO: 109.4 FL (ref 82–102)
MONOCYTES # BLD: 0.3 K/UL (ref 0.1–1.3)
MONOCYTES NFR BLD: 7 % (ref 4–12)
NEUTS SEG # BLD: 2.1 K/UL (ref 1.7–8.2)
NEUTS SEG NFR BLD: 46 % (ref 43–78)
NRBC # BLD: 0 K/UL (ref 0–0.2)
PLATELET # BLD AUTO: 204 K/UL (ref 150–450)
PMV BLD AUTO: 9.3 FL (ref 9.4–12.3)
POTASSIUM SERPL-SCNC: 3.7 MMOL/L (ref 3.5–5.1)
PROT SERPL-MCNC: 6.8 G/DL (ref 6.3–8.2)
PSA SERPL-MCNC: 7 NG/ML (ref 0–4)
RBC # BLD AUTO: 3.4 M/UL (ref 4.23–5.6)
SODIUM SERPL-SCNC: 140 MMOL/L (ref 136–145)
WBC # BLD AUTO: 4.5 K/UL (ref 4.3–11.1)

## 2024-09-13 PROCEDURE — 36415 COLL VENOUS BLD VENIPUNCTURE: CPT

## 2024-09-13 PROCEDURE — 80053 COMPREHEN METABOLIC PANEL: CPT

## 2024-09-13 PROCEDURE — 85025 COMPLETE CBC W/AUTO DIFF WBC: CPT

## 2024-09-13 PROCEDURE — 84153 ASSAY OF PSA TOTAL: CPT

## 2024-09-20 ENCOUNTER — OFFICE VISIT (OUTPATIENT)
Dept: ONCOLOGY | Age: 69
End: 2024-09-20
Payer: MEDICARE

## 2024-09-20 VITALS
DIASTOLIC BLOOD PRESSURE: 75 MMHG | HEART RATE: 83 BPM | OXYGEN SATURATION: 98 % | RESPIRATION RATE: 16 BRPM | SYSTOLIC BLOOD PRESSURE: 135 MMHG | WEIGHT: 194.8 LBS | BODY MASS INDEX: 25 KG/M2 | HEIGHT: 74 IN | TEMPERATURE: 98.8 F

## 2024-09-20 DIAGNOSIS — C92.10 CML (CHRONIC MYELOCYTIC LEUKEMIA) (HCC): Primary | ICD-10-CM

## 2024-09-20 PROCEDURE — 99214 OFFICE O/P EST MOD 30 MIN: CPT | Performed by: INTERNAL MEDICINE

## 2024-09-20 PROCEDURE — 1123F ACP DISCUSS/DSCN MKR DOCD: CPT | Performed by: INTERNAL MEDICINE

## 2024-09-20 PROCEDURE — 3017F COLORECTAL CA SCREEN DOC REV: CPT | Performed by: INTERNAL MEDICINE

## 2024-09-20 PROCEDURE — G8428 CUR MEDS NOT DOCUMENT: HCPCS | Performed by: INTERNAL MEDICINE

## 2024-09-20 PROCEDURE — G8417 CALC BMI ABV UP PARAM F/U: HCPCS | Performed by: INTERNAL MEDICINE

## 2024-09-20 PROCEDURE — 3078F DIAST BP <80 MM HG: CPT | Performed by: INTERNAL MEDICINE

## 2024-09-20 PROCEDURE — 1036F TOBACCO NON-USER: CPT | Performed by: INTERNAL MEDICINE

## 2024-09-20 PROCEDURE — 3075F SYST BP GE 130 - 139MM HG: CPT | Performed by: INTERNAL MEDICINE

## 2024-09-20 ASSESSMENT — PATIENT HEALTH QUESTIONNAIRE - PHQ9
SUM OF ALL RESPONSES TO PHQ QUESTIONS 1-9: 2
1. LITTLE INTEREST OR PLEASURE IN DOING THINGS: NOT AT ALL
SUM OF ALL RESPONSES TO PHQ9 QUESTIONS 1 & 2: 2
2. FEELING DOWN, DEPRESSED OR HOPELESS: MORE THAN HALF THE DAYS

## 2024-10-08 NOTE — ASSESSMENT & PLAN NOTE
PSA 6.8 on 6/6/2024  Per patient longstanding history of elevated PSA levels undergoing multiple biopsies in the past reportedly benign  Prior urology notes with history of prostate biopsy in May 2005 for a PSA of 8.9, biopsy in 2016 for PSA of 22 (also underwent TURP secondary to urinary retention and failed voiding trials).  Biopsy results were negative for malignancy at that time however were notable for inflammation  Have offered referral back to urology for continued surveillance and further evaluation but respectfully declines at this time
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
## 2024-10-17 ENCOUNTER — PATIENT MESSAGE (OUTPATIENT)
Dept: INTERNAL MEDICINE CLINIC | Facility: CLINIC | Age: 69
End: 2024-10-17

## 2024-10-17 ENCOUNTER — OFFICE VISIT (OUTPATIENT)
Dept: INTERNAL MEDICINE CLINIC | Facility: CLINIC | Age: 69
End: 2024-10-17
Payer: MEDICARE

## 2024-10-17 VITALS
HEART RATE: 78 BPM | WEIGHT: 191.4 LBS | SYSTOLIC BLOOD PRESSURE: 122 MMHG | HEIGHT: 74 IN | BODY MASS INDEX: 24.56 KG/M2 | TEMPERATURE: 98.2 F | DIASTOLIC BLOOD PRESSURE: 66 MMHG | OXYGEN SATURATION: 98 %

## 2024-10-17 DIAGNOSIS — Z79.899 LONG-TERM CURRENT USE OF BENZODIAZEPINE: ICD-10-CM

## 2024-10-17 DIAGNOSIS — D53.9 MACROCYTIC ANEMIA: ICD-10-CM

## 2024-10-17 DIAGNOSIS — F51.04 CHRONIC INSOMNIA: ICD-10-CM

## 2024-10-17 DIAGNOSIS — I10 ESSENTIAL HYPERTENSION: ICD-10-CM

## 2024-10-17 DIAGNOSIS — F31.60 BIPOLAR AFFECTIVE DISORDER, CURRENT EPISODE MIXED, CURRENT EPISODE SEVERITY UNSPECIFIED (HCC): Primary | ICD-10-CM

## 2024-10-17 DIAGNOSIS — Z87.2 HISTORY OF CONTACT DERMATITIS: ICD-10-CM

## 2024-10-17 DIAGNOSIS — R97.20 ELEVATED PSA: ICD-10-CM

## 2024-10-17 DIAGNOSIS — E78.5 DYSLIPIDEMIA: ICD-10-CM

## 2024-10-17 DIAGNOSIS — F31.60 BIPOLAR AFFECTIVE DISORDER, CURRENT EPISODE MIXED, CURRENT EPISODE SEVERITY UNSPECIFIED (HCC): ICD-10-CM

## 2024-10-17 DIAGNOSIS — C92.10 CML (CHRONIC MYELOCYTIC LEUKEMIA) (HCC): Primary | ICD-10-CM

## 2024-10-17 PROCEDURE — 3017F COLORECTAL CA SCREEN DOC REV: CPT | Performed by: STUDENT IN AN ORGANIZED HEALTH CARE EDUCATION/TRAINING PROGRAM

## 2024-10-17 PROCEDURE — 1036F TOBACCO NON-USER: CPT | Performed by: STUDENT IN AN ORGANIZED HEALTH CARE EDUCATION/TRAINING PROGRAM

## 2024-10-17 PROCEDURE — G8420 CALC BMI NORM PARAMETERS: HCPCS | Performed by: STUDENT IN AN ORGANIZED HEALTH CARE EDUCATION/TRAINING PROGRAM

## 2024-10-17 PROCEDURE — 3074F SYST BP LT 130 MM HG: CPT | Performed by: STUDENT IN AN ORGANIZED HEALTH CARE EDUCATION/TRAINING PROGRAM

## 2024-10-17 PROCEDURE — 1123F ACP DISCUSS/DSCN MKR DOCD: CPT | Performed by: STUDENT IN AN ORGANIZED HEALTH CARE EDUCATION/TRAINING PROGRAM

## 2024-10-17 PROCEDURE — 3078F DIAST BP <80 MM HG: CPT | Performed by: STUDENT IN AN ORGANIZED HEALTH CARE EDUCATION/TRAINING PROGRAM

## 2024-10-17 PROCEDURE — 99214 OFFICE O/P EST MOD 30 MIN: CPT | Performed by: STUDENT IN AN ORGANIZED HEALTH CARE EDUCATION/TRAINING PROGRAM

## 2024-10-17 PROCEDURE — G8484 FLU IMMUNIZE NO ADMIN: HCPCS | Performed by: STUDENT IN AN ORGANIZED HEALTH CARE EDUCATION/TRAINING PROGRAM

## 2024-10-17 PROCEDURE — G8427 DOCREV CUR MEDS BY ELIG CLIN: HCPCS | Performed by: STUDENT IN AN ORGANIZED HEALTH CARE EDUCATION/TRAINING PROGRAM

## 2024-10-17 RX ORDER — METOPROLOL TARTRATE 50 MG
50 TABLET ORAL 2 TIMES DAILY
Qty: 180 TABLET | Refills: 2 | Status: SHIPPED | OUTPATIENT
Start: 2024-10-17

## 2024-10-17 RX ORDER — AMITRIPTYLINE HYDROCHLORIDE 50 MG/1
50 TABLET ORAL NIGHTLY
Qty: 90 TABLET | Refills: 2 | Status: SHIPPED | OUTPATIENT
Start: 2024-10-17

## 2024-10-17 RX ORDER — LOVASTATIN 40 MG
40 TABLET ORAL NIGHTLY
Qty: 90 TABLET | Refills: 2 | Status: SHIPPED | OUTPATIENT
Start: 2024-10-17

## 2024-10-17 RX ORDER — TRIAMCINOLONE ACETONIDE 1 MG/G
CREAM TOPICAL
Qty: 454 G | Refills: 2 | Status: SHIPPED | OUTPATIENT
Start: 2024-10-17

## 2024-10-17 ASSESSMENT — ENCOUNTER SYMPTOMS
DIARRHEA: 1
BACK PAIN: 1
ANAL BLEEDING: 0
BLOOD IN STOOL: 0
NAUSEA: 0

## 2024-10-17 ASSESSMENT — PATIENT HEALTH QUESTIONNAIRE - PHQ9
4. FEELING TIRED OR HAVING LITTLE ENERGY: NEARLY EVERY DAY
3. TROUBLE FALLING OR STAYING ASLEEP: NOT AT ALL
SUM OF ALL RESPONSES TO PHQ QUESTIONS 1-9: 15
9. THOUGHTS THAT YOU WOULD BE BETTER OFF DEAD, OR OF HURTING YOURSELF: NOT AT ALL
7. TROUBLE CONCENTRATING ON THINGS, SUCH AS READING THE NEWSPAPER OR WATCHING TELEVISION: NOT AT ALL
8. MOVING OR SPEAKING SO SLOWLY THAT OTHER PEOPLE COULD HAVE NOTICED. OR THE OPPOSITE, BEING SO FIGETY OR RESTLESS THAT YOU HAVE BEEN MOVING AROUND A LOT MORE THAN USUAL: NOT AT ALL
1. LITTLE INTEREST OR PLEASURE IN DOING THINGS: NEARLY EVERY DAY
5. POOR APPETITE OR OVEREATING: NEARLY EVERY DAY
SUM OF ALL RESPONSES TO PHQ QUESTIONS 1-9: 15
SUM OF ALL RESPONSES TO PHQ QUESTIONS 1-9: 15
SUM OF ALL RESPONSES TO PHQ9 QUESTIONS 1 & 2: 6
SUM OF ALL RESPONSES TO PHQ QUESTIONS 1-9: 15
10. IF YOU CHECKED OFF ANY PROBLEMS, HOW DIFFICULT HAVE THESE PROBLEMS MADE IT FOR YOU TO DO YOUR WORK, TAKE CARE OF THINGS AT HOME, OR GET ALONG WITH OTHER PEOPLE: SOMEWHAT DIFFICULT
2. FEELING DOWN, DEPRESSED OR HOPELESS: NEARLY EVERY DAY
6. FEELING BAD ABOUT YOURSELF - OR THAT YOU ARE A FAILURE OR HAVE LET YOURSELF OR YOUR FAMILY DOWN: NEARLY EVERY DAY

## 2024-10-22 ENCOUNTER — OFFICE VISIT (OUTPATIENT)
Dept: BEHAVIORAL/MENTAL HEALTH CLINIC | Facility: CLINIC | Age: 69
End: 2024-10-22
Payer: MEDICARE

## 2024-10-22 VITALS
HEART RATE: 80 BPM | OXYGEN SATURATION: 98 % | HEIGHT: 74 IN | WEIGHT: 189.2 LBS | DIASTOLIC BLOOD PRESSURE: 68 MMHG | BODY MASS INDEX: 24.28 KG/M2 | SYSTOLIC BLOOD PRESSURE: 136 MMHG

## 2024-10-22 DIAGNOSIS — F41.9 ANXIETY DISORDER, UNSPECIFIED TYPE: ICD-10-CM

## 2024-10-22 DIAGNOSIS — G47.00 INSOMNIA, UNSPECIFIED TYPE: ICD-10-CM

## 2024-10-22 DIAGNOSIS — F43.23 ADJUSTMENT DISORDER WITH MIXED ANXIETY AND DEPRESSED MOOD: Primary | ICD-10-CM

## 2024-10-22 PROCEDURE — 90792 PSYCH DIAG EVAL W/MED SRVCS: CPT | Performed by: PSYCHIATRY & NEUROLOGY

## 2024-10-22 RX ORDER — DIVALPROEX SODIUM 500 MG/1
500 TABLET, FILM COATED, EXTENDED RELEASE ORAL
Qty: 30 TABLET | Refills: 1 | Status: SHIPPED | OUTPATIENT
Start: 2024-10-22

## 2024-10-22 ASSESSMENT — PATIENT HEALTH QUESTIONNAIRE - PHQ9
SUM OF ALL RESPONSES TO PHQ QUESTIONS 1-9: 3
2. FEELING DOWN, DEPRESSED OR HOPELESS: SEVERAL DAYS
1. LITTLE INTEREST OR PLEASURE IN DOING THINGS: SEVERAL DAYS
SUM OF ALL RESPONSES TO PHQ9 QUESTIONS 1 & 2: 2
3. TROUBLE FALLING OR STAYING ASLEEP: SEVERAL DAYS
SUM OF ALL RESPONSES TO PHQ QUESTIONS 1-9: 3

## 2024-10-22 NOTE — PROGRESS NOTES
NEW PATIENT EVALUATION  Patient: Lan Montgomery         Date: 10/22/2024   MR#: [id number]              : 1955  IDENTIFYING INFORMATION: This is a 69 y.o. old male who is a patient whom presents to clinic for initial evaluation.   CHIEF COMPLAINT: mood  REFERRING PROVIDER: Alphonso Frazier DO   HISTORY OF PRESENT ILLNESS:  Interval History:  Endorses ongoing mood issues. Some irritability. Feels like he lost his edge. Discussed many thanh wrongs that had been done to him. Suspect much of his mood is related simply to his own personality and not necessarily Bipolar Disorder. Not able to handle and cope as well as he ages. Anxiety and mood issues can arise as aging offers. Dealing with cancer. Would recommend therapy as primary treatment. Did discuss trial of Depakote to help with mood. Has tried SSRI before without much effect. Uses Elavil for sleep, pain. Takes high dose Ativan. Did not have discussion on its long term effects. The dose is much higher than our office will prescribe. Written for 6mg per day. Max is 2mg per day. Will follow up. Can continue to receive from PCP.  Current Symptoms  Duration: chronic  Sleep: fair to good with Elavil  Appetite: fair  Anxiety: endorses at times  Mood: labile  Compliance with medications: endorses  Side effects from the medications: denies from Ativan, Elavil  Current stressors: finances    Memory changes/ADL's if applicable: baseline  Substance History: EtOH: denies Illicit Substances denies  Family History: denies  Social History: , denies abuse hx  Lives with/Support from: lives with wife    Psychiatric Review of Systems:  Depressive symptoms:  Endorses: decreased mood, anhedonia, poor sleep/concentration/energy, decreased appetite, and impaired social and occupational functioning. Feelings of hopeless, helpless, and worthless.  Manic symptoms:  Denies: distinct period of abnormally and persistently elevated, expansive, or irritable mood for > 4 days

## 2024-12-03 ENCOUNTER — OFFICE VISIT (OUTPATIENT)
Dept: BEHAVIORAL/MENTAL HEALTH CLINIC | Facility: CLINIC | Age: 69
End: 2024-12-03
Payer: MEDICARE

## 2024-12-03 VITALS
OXYGEN SATURATION: 99 % | SYSTOLIC BLOOD PRESSURE: 112 MMHG | DIASTOLIC BLOOD PRESSURE: 60 MMHG | HEART RATE: 78 BPM | BODY MASS INDEX: 25.77 KG/M2 | WEIGHT: 200.8 LBS | HEIGHT: 74 IN

## 2024-12-03 DIAGNOSIS — F43.23 ADJUSTMENT DISORDER WITH MIXED ANXIETY AND DEPRESSED MOOD: Primary | ICD-10-CM

## 2024-12-03 DIAGNOSIS — F41.9 ANXIETY DISORDER, UNSPECIFIED TYPE: ICD-10-CM

## 2024-12-03 DIAGNOSIS — G47.00 INSOMNIA, UNSPECIFIED TYPE: ICD-10-CM

## 2024-12-03 PROCEDURE — 1036F TOBACCO NON-USER: CPT | Performed by: PSYCHIATRY & NEUROLOGY

## 2024-12-03 PROCEDURE — 99214 OFFICE O/P EST MOD 30 MIN: CPT | Performed by: PSYCHIATRY & NEUROLOGY

## 2024-12-03 PROCEDURE — G8427 DOCREV CUR MEDS BY ELIG CLIN: HCPCS | Performed by: PSYCHIATRY & NEUROLOGY

## 2024-12-03 PROCEDURE — 3078F DIAST BP <80 MM HG: CPT | Performed by: PSYCHIATRY & NEUROLOGY

## 2024-12-03 PROCEDURE — 3017F COLORECTAL CA SCREEN DOC REV: CPT | Performed by: PSYCHIATRY & NEUROLOGY

## 2024-12-03 PROCEDURE — 90833 PSYTX W PT W E/M 30 MIN: CPT | Performed by: PSYCHIATRY & NEUROLOGY

## 2024-12-03 PROCEDURE — 1159F MED LIST DOCD IN RCRD: CPT | Performed by: PSYCHIATRY & NEUROLOGY

## 2024-12-03 PROCEDURE — 1123F ACP DISCUSS/DSCN MKR DOCD: CPT | Performed by: PSYCHIATRY & NEUROLOGY

## 2024-12-03 PROCEDURE — G8417 CALC BMI ABV UP PARAM F/U: HCPCS | Performed by: PSYCHIATRY & NEUROLOGY

## 2024-12-03 PROCEDURE — G8484 FLU IMMUNIZE NO ADMIN: HCPCS | Performed by: PSYCHIATRY & NEUROLOGY

## 2024-12-03 PROCEDURE — 3074F SYST BP LT 130 MM HG: CPT | Performed by: PSYCHIATRY & NEUROLOGY

## 2024-12-03 RX ORDER — DIVALPROEX SODIUM 500 MG/1
500 TABLET, FILM COATED, EXTENDED RELEASE ORAL
Qty: 90 TABLET | Refills: 0 | Status: SHIPPED | OUTPATIENT
Start: 2024-12-03

## 2024-12-03 ASSESSMENT — PATIENT HEALTH QUESTIONNAIRE - PHQ9
7. TROUBLE CONCENTRATING ON THINGS, SUCH AS READING THE NEWSPAPER OR WATCHING TELEVISION: SEVERAL DAYS
9. THOUGHTS THAT YOU WOULD BE BETTER OFF DEAD, OR OF HURTING YOURSELF: NOT AT ALL
2. FEELING DOWN, DEPRESSED OR HOPELESS: NEARLY EVERY DAY
3. TROUBLE FALLING OR STAYING ASLEEP: SEVERAL DAYS
8. MOVING OR SPEAKING SO SLOWLY THAT OTHER PEOPLE COULD HAVE NOTICED. OR THE OPPOSITE, BEING SO FIGETY OR RESTLESS THAT YOU HAVE BEEN MOVING AROUND A LOT MORE THAN USUAL: NOT AT ALL
SUM OF ALL RESPONSES TO PHQ9 QUESTIONS 1 & 2: 6
6. FEELING BAD ABOUT YOURSELF - OR THAT YOU ARE A FAILURE OR HAVE LET YOURSELF OR YOUR FAMILY DOWN: SEVERAL DAYS
4. FEELING TIRED OR HAVING LITTLE ENERGY: MORE THAN HALF THE DAYS
SUM OF ALL RESPONSES TO PHQ QUESTIONS 1-9: 11
1. LITTLE INTEREST OR PLEASURE IN DOING THINGS: NEARLY EVERY DAY
5. POOR APPETITE OR OVEREATING: NOT AT ALL
SUM OF ALL RESPONSES TO PHQ QUESTIONS 1-9: 11

## 2024-12-03 NOTE — PROGRESS NOTES
hopeless 3 1 3   Trouble falling or staying asleep, or sleeping too much 1 1 0   Feeling tired or having little energy 2  3   Poor appetite or overeating 0  3   Feeling bad about yourself - or that you are a failure or have let yourself or your family down 1  3   Trouble concentrating on things, such as reading the newspaper or watching television 1  0   Moving or speaking so slowly that other people could have noticed. Or the opposite - being so fidgety or restless that you have been moving around a lot more than usual 0  0   Thoughts that you would be better off dead, or of hurting yourself in some way 0  0   PHQ-2 Score 6 2 6   PHQ-9 Total Score 11 3 15   If you checked off any problems, how difficult have these problems made it for you to do your work, take care of things at home, or get along with other people?   1     GAD7:       No data to display              Return to Clinic: 3 months OR sooner if needed    I have reviewed the SCRIPTS database report for this patient as required for prescription of controlled substances and did not note any discrepancies.  Medication side effects were discussed and benefits v. risks were presented. Treatment plan was discussed and agreed to by patient.  Diagnoses considered in this plan are \"working diagnoses\" and subject to change with additional information obtained over the course of additional time spent with patient and/or additional collateral information.  Currently, considering risks and protective factors, this patient has a:  low risk of suicide  low risk of homicide  Crisis Plan:  Patient encouraged to call 911 and /or go to the closest Emergency Department in case of agitation, severe anxiety, psychosis, carisa, suicidal or homicidal urges, worrisome side effects to medications or other emergencies. Patient verbalized understanding of this plan and agreed to it.  This note has been completed using [employer] Dictation Software and while attempts have been

## 2024-12-16 ENCOUNTER — HOSPITAL ENCOUNTER (OUTPATIENT)
Dept: LAB | Age: 69
Discharge: HOME | End: 2024-12-16
Payer: MEDICARE

## 2024-12-16 DIAGNOSIS — C92.10 CML (CHRONIC MYELOCYTIC LEUKEMIA) (HCC): ICD-10-CM

## 2024-12-16 LAB
ALBUMIN SERPL-MCNC: 3.7 G/DL (ref 3.2–4.6)
ALBUMIN/GLOB SERPL: 1.1 (ref 1–1.9)
ALP SERPL-CCNC: 53 U/L (ref 40–129)
ALT SERPL-CCNC: 18 U/L (ref 8–55)
ANION GAP SERPL CALC-SCNC: 8 MMOL/L (ref 7–16)
AST SERPL-CCNC: 30 U/L (ref 15–37)
BASOPHILS # BLD: 0 K/UL (ref 0–0.2)
BASOPHILS NFR BLD: 1 % (ref 0–2)
BILIRUB SERPL-MCNC: 0.4 MG/DL (ref 0–1.2)
BUN SERPL-MCNC: 16 MG/DL (ref 8–23)
CALCIUM SERPL-MCNC: 8.8 MG/DL (ref 8.8–10.2)
CHLORIDE SERPL-SCNC: 102 MMOL/L (ref 98–107)
CO2 SERPL-SCNC: 31 MMOL/L (ref 20–29)
CREAT SERPL-MCNC: 1.06 MG/DL (ref 0.8–1.3)
DIFFERENTIAL METHOD BLD: ABNORMAL
EOSINOPHIL # BLD: 0.5 K/UL (ref 0–0.8)
EOSINOPHIL NFR BLD: 8 % (ref 0.5–7.8)
ERYTHROCYTE [DISTWIDTH] IN BLOOD BY AUTOMATED COUNT: 13.2 % (ref 11.9–14.6)
GLOBULIN SER CALC-MCNC: 3.4 G/DL (ref 2.3–3.5)
GLUCOSE SERPL-MCNC: 100 MG/DL (ref 70–99)
HCT VFR BLD AUTO: 38.7 % (ref 41.1–50.3)
HGB BLD-MCNC: 12.7 G/DL (ref 13.6–17.2)
IMM GRANULOCYTES # BLD AUTO: 0 K/UL (ref 0–0.5)
IMM GRANULOCYTES NFR BLD AUTO: 0 % (ref 0–5)
LYMPHOCYTES # BLD: 1.8 K/UL (ref 0.5–4.6)
LYMPHOCYTES NFR BLD: 29 % (ref 13–44)
MCH RBC QN AUTO: 36.3 PG (ref 26.1–32.9)
MCHC RBC AUTO-ENTMCNC: 32.8 G/DL (ref 31.4–35)
MCV RBC AUTO: 110.6 FL (ref 82–102)
MONOCYTES # BLD: 0.5 K/UL (ref 0.1–1.3)
MONOCYTES NFR BLD: 8 % (ref 4–12)
NEUTS SEG # BLD: 3.3 K/UL (ref 1.7–8.2)
NEUTS SEG NFR BLD: 54 % (ref 43–78)
NRBC # BLD: 0 K/UL (ref 0–0.2)
PLATELET # BLD AUTO: 227 K/UL (ref 150–450)
PMV BLD AUTO: 9.1 FL (ref 9.4–12.3)
POTASSIUM SERPL-SCNC: 4.1 MMOL/L (ref 3.5–5.1)
PROT SERPL-MCNC: 7.1 G/DL (ref 6.3–8.2)
RBC # BLD AUTO: 3.5 M/UL (ref 4.23–5.6)
SODIUM SERPL-SCNC: 141 MMOL/L (ref 136–145)
WBC # BLD AUTO: 6.2 K/UL (ref 4.3–11.1)

## 2024-12-16 PROCEDURE — 80053 COMPREHEN METABOLIC PANEL: CPT

## 2024-12-16 PROCEDURE — 85025 COMPLETE CBC W/AUTO DIFF WBC: CPT

## 2024-12-16 PROCEDURE — 36415 COLL VENOUS BLD VENIPUNCTURE: CPT

## 2024-12-16 RX ORDER — LORAZEPAM 2 MG/1
2 TABLET ORAL EVERY 8 HOURS PRN
Qty: 270 TABLET | Refills: 0 | Status: SHIPPED | OUTPATIENT
Start: 2024-12-16 | End: 2025-03-16

## 2024-12-16 NOTE — TELEPHONE ENCOUNTER
Refill request received for lorazepam.  Last seen in office on 10/17/2024.  Controlled substance agreement previously signed.  Follow-up visit scheduled for 1/3/2025. PDMP reviewed showing lorazepam 2 mg tablets, quantity #270, 90-day supply last filled on 9/16/2024.  New prescription sent to pharmacy    Orders Placed This Encounter    LORazepam (ATIVAN) 2 MG tablet     Sig: Take 1 tablet by mouth every 8 hours as needed for Anxiety for up to 90 days. May cause drowsiness.  Do not drink alcohol, drive or operate heavy machinery after use Max Daily Amount: 6 mg     Dispense:  270 tablet     Refill:  0

## 2024-12-20 ENCOUNTER — OFFICE VISIT (OUTPATIENT)
Dept: ONCOLOGY | Age: 69
End: 2024-12-20
Payer: MEDICARE

## 2024-12-20 VITALS
HEIGHT: 74 IN | RESPIRATION RATE: 17 BRPM | OXYGEN SATURATION: 99 % | BODY MASS INDEX: 26.03 KG/M2 | TEMPERATURE: 97.8 F | SYSTOLIC BLOOD PRESSURE: 141 MMHG | DIASTOLIC BLOOD PRESSURE: 81 MMHG | HEART RATE: 82 BPM | WEIGHT: 202.8 LBS

## 2024-12-20 DIAGNOSIS — C92.10 CML (CHRONIC MYELOCYTIC LEUKEMIA) (HCC): Primary | ICD-10-CM

## 2024-12-20 PROCEDURE — 1036F TOBACCO NON-USER: CPT | Performed by: INTERNAL MEDICINE

## 2024-12-20 PROCEDURE — G8428 CUR MEDS NOT DOCUMENT: HCPCS | Performed by: INTERNAL MEDICINE

## 2024-12-20 PROCEDURE — G8417 CALC BMI ABV UP PARAM F/U: HCPCS | Performed by: INTERNAL MEDICINE

## 2024-12-20 PROCEDURE — G8484 FLU IMMUNIZE NO ADMIN: HCPCS | Performed by: INTERNAL MEDICINE

## 2024-12-20 PROCEDURE — 3077F SYST BP >= 140 MM HG: CPT | Performed by: INTERNAL MEDICINE

## 2024-12-20 PROCEDURE — 3017F COLORECTAL CA SCREEN DOC REV: CPT | Performed by: INTERNAL MEDICINE

## 2024-12-20 PROCEDURE — 99214 OFFICE O/P EST MOD 30 MIN: CPT | Performed by: INTERNAL MEDICINE

## 2024-12-20 PROCEDURE — 3079F DIAST BP 80-89 MM HG: CPT | Performed by: INTERNAL MEDICINE

## 2024-12-20 PROCEDURE — 1126F AMNT PAIN NOTED NONE PRSNT: CPT | Performed by: INTERNAL MEDICINE

## 2024-12-20 PROCEDURE — 1123F ACP DISCUSS/DSCN MKR DOCD: CPT | Performed by: INTERNAL MEDICINE

## 2024-12-20 ASSESSMENT — PATIENT HEALTH QUESTIONNAIRE - PHQ9
SUM OF ALL RESPONSES TO PHQ QUESTIONS 1-9: 0
SUM OF ALL RESPONSES TO PHQ QUESTIONS 1-9: 0
SUM OF ALL RESPONSES TO PHQ9 QUESTIONS 1 & 2: 0
SUM OF ALL RESPONSES TO PHQ QUESTIONS 1-9: 0
1. LITTLE INTEREST OR PLEASURE IN DOING THINGS: NOT AT ALL
2. FEELING DOWN, DEPRESSED OR HOPELESS: NOT AT ALL
SUM OF ALL RESPONSES TO PHQ QUESTIONS 1-9: 0

## 2024-12-20 NOTE — PROGRESS NOTES
dietary interventions, he has settled on [employer] cereal as the food he takes with Gleevec to minimize GI issues.  No fevers, infectious symptoms, or night sweats. Labs reviewed and counts stable, WBC 6.2 with normal diff, Hgb 12.7, platelets 227K.  BCR/abl not done today as he was below the MMR threshold at 12 months.  We will continue Gleevec as planned at the current dose.  He will return in 3 months for repeat OV and BCR/abl testing (that will be the 18 month milestone).  All questions were asked and answered to the best of my ability.          Monroe Burton MD   [facility]  [address] [place] [address]  Office : [phone]  Fax : [phone]

## 2024-12-20 NOTE — PATIENT INSTRUCTIONS
Ratio 12/16/2024 1.1  1.0 - 1.9   Final    WBC 12/16/2024 6.2  4.3 - 11.1 K/uL Final    RBC 12/16/2024 3.50 (L)  4.23 - 5.6 M/uL Final    Hemoglobin 12/16/2024 12.7 (L)  13.6 - 17.2 g/dL Final    Hematocrit 12/16/2024 38.7 (L)  41.1 - 50.3 % Final    MCV 12/16/2024 110.6 (H)  82.0 - 102.0 FL Final    MCH 12/16/2024 36.3 (H)  26.1 - 32.9 PG Final    MCHC 12/16/2024 32.8  31.4 - 35.0 g/dL Final    RDW 12/16/2024 13.2  11.9 - 14.6 % Final    Platelets 12/16/2024 227  150 - 450 K/uL Final    MPV 12/16/2024 9.1 (L)  9.4 - 12.3 FL Final    nRBC 12/16/2024 0.00  0.0 - 0.2 K/uL Final    **Note: Absolute NRBC parameter is now reported with Hemogram**    Neutrophils % 12/16/2024 54  43 - 78 % Final    Lymphocytes % 12/16/2024 29  13 - 44 % Final    Monocytes % 12/16/2024 8  4.0 - 12.0 % Final    Eosinophils % 12/16/2024 8 (H)  0.5 - 7.8 % Final    Basophils % 12/16/2024 1  0.0 - 2.0 % Final    Immature Granulocytes % 12/16/2024 0  0.0 - 5.0 % Final    Neutrophils Absolute 12/16/2024 3.3  1.7 - 8.2 K/UL Final    Lymphocytes Absolute 12/16/2024 1.8  0.5 - 4.6 K/UL Final    Monocytes Absolute 12/16/2024 0.5  0.1 - 1.3 K/UL Final    Eosinophils Absolute 12/16/2024 0.5  0.0 - 0.8 K/UL Final    Basophils Absolute 12/16/2024 0.0  0.0 - 0.2 K/UL Final    Immature Granulocytes Absolute 12/16/2024 0.0  0.0 - 0.5 K/UL Final    Differential Type 12/16/2024 AUTOMATED    Final

## 2024-12-23 DIAGNOSIS — C92.10 CML (CHRONIC MYELOCYTIC LEUKEMIA) (HCC): ICD-10-CM

## 2024-12-26 RX ORDER — IMATINIB MESYLATE 400 MG/1
400 TABLET, FILM COATED ORAL DAILY
Qty: 30 TABLET | Refills: 2 | Status: ACTIVE | OUTPATIENT
Start: 2024-12-26

## 2024-12-30 NOTE — PROGRESS NOTES
Lan Montgomery (:  1955) is a 69 y.o. male,Established patient, here for evaluation of the following chief complaint(s):  No chief complaint on file.         Assessment & Plan  CML (chronic myelocytic leukemia) (HCC)  Peripheral smear on 3/20/2023 with absolute left shifted neutrophilia with basophilia suspicious for a chronic myeloid neoplasm  Positive BCR able 1 translocation on 2023  Flow cytometry with relative left shifted myeloid hyperplasia with 0.1% CD34 positive myeloblasts and relatively increased basophils detected  Continue Gleevec per oncology         Adjustment disorder with mixed anxiety and depressed mood            Long-term current use of benzodiazepine  Questionable history of bipolar disorder  Currently on Depakote extended release per psychiatry  Continue amitriptyline and as needed lorazepam  Controlled substance agreement previously signed         Essential hypertension  Continue metoprolol tartrate         Dyslipidemia  Lipid panel at goal from 2024  Continue lovastatin         Chronic insomnia  Continue amitriptyline         Macrocytic anemia  Baseline hemoglobin 12-13 per chart review with macrocytosis  Normal white blood cell count, platelet count.  White blood cell count differential relatively unremarkable  Possibly secondary to Gleevec  If worsening in the future consider checking folate and B12 levels to evaluate for deficiency         Elevated PSA  PSA 6.8 on 2024  Per patient longstanding history of elevated PSA levels undergoing multiple biopsies in the past reportedly benign  Prior urology notes with history of prostate biopsy in May 2005 for a PSA of 8.9, biopsy in 2016 for PSA of 22 (also underwent TURP secondary to urinary retention and failed voiding trials).  Biopsy results were negative for malignancy at that time however were notable for inflammation  Have offered referral back to urology for continued surveillance and further evaluation but

## 2025-01-03 ENCOUNTER — OFFICE VISIT (OUTPATIENT)
Dept: INTERNAL MEDICINE CLINIC | Facility: CLINIC | Age: 70
End: 2025-01-03

## 2025-01-03 VITALS
HEIGHT: 74 IN | SYSTOLIC BLOOD PRESSURE: 138 MMHG | DIASTOLIC BLOOD PRESSURE: 78 MMHG | OXYGEN SATURATION: 97 % | BODY MASS INDEX: 27.08 KG/M2 | WEIGHT: 211 LBS | TEMPERATURE: 98.2 F | HEART RATE: 83 BPM

## 2025-01-03 DIAGNOSIS — G89.29 CHRONIC RIGHT-SIDED LOW BACK PAIN WITHOUT SCIATICA: ICD-10-CM

## 2025-01-03 DIAGNOSIS — F51.04 CHRONIC INSOMNIA: ICD-10-CM

## 2025-01-03 DIAGNOSIS — C92.10 CML (CHRONIC MYELOCYTIC LEUKEMIA) (HCC): Primary | ICD-10-CM

## 2025-01-03 DIAGNOSIS — Z79.899 LONG-TERM CURRENT USE OF BENZODIAZEPINE: ICD-10-CM

## 2025-01-03 DIAGNOSIS — M54.50 CHRONIC RIGHT-SIDED LOW BACK PAIN WITHOUT SCIATICA: ICD-10-CM

## 2025-01-03 DIAGNOSIS — I10 ESSENTIAL HYPERTENSION: ICD-10-CM

## 2025-01-03 DIAGNOSIS — F43.23 ADJUSTMENT DISORDER WITH MIXED ANXIETY AND DEPRESSED MOOD: ICD-10-CM

## 2025-01-03 ASSESSMENT — ENCOUNTER SYMPTOMS
ABDOMINAL PAIN: 0
SHORTNESS OF BREATH: 0
NAUSEA: 0
VOMITING: 0
BACK PAIN: 1
BLOOD IN STOOL: 0
ANAL BLEEDING: 0

## 2025-01-03 NOTE — PROGRESS NOTES
Lan Montgomery (:  1955) is a 69 y.o. male,Established patient, here for evaluation of the following chief complaint(s):  3 Month Follow-Up (Pt is here for a 3 month follow up. ), Hypertension, and Depression         Assessment & Plan  CML (chronic myelocytic leukemia) (HCC)  Peripheral smear on 3/20/2023 with absolute left shifted neutrophilia with basophilia suspicious for a chronic myeloid neoplasm  Positive BCR able 1 translocation on 2023  Flow cytometry with relative left shifted myeloid hyperplasia with 0.1% CD34 positive myeloblasts and relatively increased basophils detected  Continue Gleevec per oncology         Adjustment disorder with mixed anxiety and depressed mood            Long-term current use of benzodiazepine  Continue Depakote extended release, amitriptyline, as needed lorazepam  Currently being followed by psychiatry, pending psychology/therapist appointment         Essential hypertension  Continue metoprolol tartrate         Chronic insomnia  Continue amitriptyline and lorazepam         Chronic right-sided low back pain without sciatica  Likely lumbosacral osteoarthritis and lumbar paraspinal strain  Although patient does have history of elevated PSA he has history of negative prostate biopsy for malignancy and symptoms of current back pain have been ongoing for years  Continue supportive care with as needed topical therapy.  If symptoms progress or worsen patient to alert provider at which point we will likely consider radiograph           Return for f/u 5 months EOV .       Subjective   Patient is a 69-year-old  male who presents to the office today for follow-up.  Patient having minor flareup of chronic right-sided low back pain above the hip.  Has been ongoing for years usually aggravated with subtle movements such as bending and twisting.  Does not radiate down the leg.  Improved with ice, heat and other topical therapy.  No dysuria or hematuria, abdominal pain,

## 2025-02-23 ASSESSMENT — PATIENT HEALTH QUESTIONNAIRE - PHQ9
4. FEELING TIRED OR HAVING LITTLE ENERGY: SEVERAL DAYS
1. LITTLE INTEREST OR PLEASURE IN DOING THINGS: SEVERAL DAYS
SUM OF ALL RESPONSES TO PHQ QUESTIONS 1-9: 9
SUM OF ALL RESPONSES TO PHQ QUESTIONS 1-9: 9
3. TROUBLE FALLING OR STAYING ASLEEP: SEVERAL DAYS
5. POOR APPETITE OR OVEREATING: SEVERAL DAYS
7. TROUBLE CONCENTRATING ON THINGS, SUCH AS READING THE NEWSPAPER OR WATCHING TELEVISION: SEVERAL DAYS
2. FEELING DOWN, DEPRESSED OR HOPELESS: SEVERAL DAYS
SUM OF ALL RESPONSES TO PHQ9 QUESTIONS 1 & 2: 2
10. IF YOU CHECKED OFF ANY PROBLEMS, HOW DIFFICULT HAVE THESE PROBLEMS MADE IT FOR YOU TO DO YOUR WORK, TAKE CARE OF THINGS AT HOME, OR GET ALONG WITH OTHER PEOPLE: SOMEWHAT DIFFICULT
4. FEELING TIRED OR HAVING LITTLE ENERGY: SEVERAL DAYS
SUM OF ALL RESPONSES TO PHQ QUESTIONS 1-9: 9
10. IF YOU CHECKED OFF ANY PROBLEMS, HOW DIFFICULT HAVE THESE PROBLEMS MADE IT FOR YOU TO DO YOUR WORK, TAKE CARE OF THINGS AT HOME, OR GET ALONG WITH OTHER PEOPLE: SOMEWHAT DIFFICULT
6. FEELING BAD ABOUT YOURSELF - OR THAT YOU ARE A FAILURE OR HAVE LET YOURSELF OR YOUR FAMILY DOWN: MORE THAN HALF THE DAYS
1. LITTLE INTEREST OR PLEASURE IN DOING THINGS: SEVERAL DAYS
7. TROUBLE CONCENTRATING ON THINGS, SUCH AS READING THE NEWSPAPER OR WATCHING TELEVISION: SEVERAL DAYS
6. FEELING BAD ABOUT YOURSELF - OR THAT YOU ARE A FAILURE OR HAVE LET YOURSELF OR YOUR FAMILY DOWN: MORE THAN HALF THE DAYS
5. POOR APPETITE OR OVEREATING: SEVERAL DAYS
2. FEELING DOWN, DEPRESSED OR HOPELESS: SEVERAL DAYS
SUM OF ALL RESPONSES TO PHQ QUESTIONS 1-9: 9
9. THOUGHTS THAT YOU WOULD BE BETTER OFF DEAD, OR OF HURTING YOURSELF: NOT AT ALL
3. TROUBLE FALLING OR STAYING ASLEEP: SEVERAL DAYS
9. THOUGHTS THAT YOU WOULD BE BETTER OFF DEAD, OR OF HURTING YOURSELF: NOT AT ALL
8. MOVING OR SPEAKING SO SLOWLY THAT OTHER PEOPLE COULD HAVE NOTICED. OR THE OPPOSITE, BEING SO FIGETY OR RESTLESS THAT YOU HAVE BEEN MOVING AROUND A LOT MORE THAN USUAL: SEVERAL DAYS
SUM OF ALL RESPONSES TO PHQ QUESTIONS 1-9: 9
8. MOVING OR SPEAKING SO SLOWLY THAT OTHER PEOPLE COULD HAVE NOTICED. OR THE OPPOSITE - BEING SO FIDGETY OR RESTLESS THAT YOU HAVE BEEN MOVING AROUND A LOT MORE THAN USUAL: SEVERAL DAYS

## 2025-02-23 ASSESSMENT — ANXIETY QUESTIONNAIRES
1. FEELING NERVOUS, ANXIOUS, OR ON EDGE: SEVERAL DAYS
2. NOT BEING ABLE TO STOP OR CONTROL WORRYING: MORE THAN HALF THE DAYS
6. BECOMING EASILY ANNOYED OR IRRITABLE: SEVERAL DAYS
4. TROUBLE RELAXING: SEVERAL DAYS
5. BEING SO RESTLESS THAT IT IS HARD TO SIT STILL: NOT AT ALL
IF YOU CHECKED OFF ANY PROBLEMS ON THIS QUESTIONNAIRE, HOW DIFFICULT HAVE THESE PROBLEMS MADE IT FOR YOU TO DO YOUR WORK, TAKE CARE OF THINGS AT HOME, OR GET ALONG WITH OTHER PEOPLE: SOMEWHAT DIFFICULT
GAD7 TOTAL SCORE: 8
6. BECOMING EASILY ANNOYED OR IRRITABLE: SEVERAL DAYS
3. WORRYING TOO MUCH ABOUT DIFFERENT THINGS: SEVERAL DAYS
3. WORRYING TOO MUCH ABOUT DIFFERENT THINGS: SEVERAL DAYS
4. TROUBLE RELAXING: SEVERAL DAYS
1. FEELING NERVOUS, ANXIOUS, OR ON EDGE: SEVERAL DAYS
5. BEING SO RESTLESS THAT IT IS HARD TO SIT STILL: NOT AT ALL
7. FEELING AFRAID AS IF SOMETHING AWFUL MIGHT HAPPEN: MORE THAN HALF THE DAYS
IF YOU CHECKED OFF ANY PROBLEMS ON THIS QUESTIONNAIRE, HOW DIFFICULT HAVE THESE PROBLEMS MADE IT FOR YOU TO DO YOUR WORK, TAKE CARE OF THINGS AT HOME, OR GET ALONG WITH OTHER PEOPLE: SOMEWHAT DIFFICULT
2. NOT BEING ABLE TO STOP OR CONTROL WORRYING: MORE THAN HALF THE DAYS
7. FEELING AFRAID AS IF SOMETHING AWFUL MIGHT HAPPEN: MORE THAN HALF THE DAYS

## 2025-02-26 ENCOUNTER — OFFICE VISIT (OUTPATIENT)
Dept: BEHAVIORAL/MENTAL HEALTH CLINIC | Facility: CLINIC | Age: 70
End: 2025-02-26
Payer: MEDICARE

## 2025-02-26 VITALS — HEIGHT: 74 IN | BODY MASS INDEX: 26.9 KG/M2 | WEIGHT: 209.6 LBS

## 2025-02-26 DIAGNOSIS — F43.23 ADJUSTMENT DISORDER WITH MIXED ANXIETY AND DEPRESSED MOOD: ICD-10-CM

## 2025-02-26 PROCEDURE — 1036F TOBACCO NON-USER: CPT | Performed by: PSYCHIATRY & NEUROLOGY

## 2025-02-26 PROCEDURE — 1160F RVW MEDS BY RX/DR IN RCRD: CPT | Performed by: PSYCHIATRY & NEUROLOGY

## 2025-02-26 PROCEDURE — G8417 CALC BMI ABV UP PARAM F/U: HCPCS | Performed by: PSYCHIATRY & NEUROLOGY

## 2025-02-26 PROCEDURE — 1159F MED LIST DOCD IN RCRD: CPT | Performed by: PSYCHIATRY & NEUROLOGY

## 2025-02-26 PROCEDURE — 99214 OFFICE O/P EST MOD 30 MIN: CPT | Performed by: PSYCHIATRY & NEUROLOGY

## 2025-02-26 PROCEDURE — 3017F COLORECTAL CA SCREEN DOC REV: CPT | Performed by: PSYCHIATRY & NEUROLOGY

## 2025-02-26 PROCEDURE — 1123F ACP DISCUSS/DSCN MKR DOCD: CPT | Performed by: PSYCHIATRY & NEUROLOGY

## 2025-02-26 PROCEDURE — G8427 DOCREV CUR MEDS BY ELIG CLIN: HCPCS | Performed by: PSYCHIATRY & NEUROLOGY

## 2025-02-26 RX ORDER — LAMOTRIGINE 100 MG/1
TABLET ORAL
Qty: 30 TABLET | Refills: 1 | Status: SHIPPED | OUTPATIENT
Start: 2025-02-26

## 2025-02-26 NOTE — PROGRESS NOTES
PROGRESS NOTE  Patient: Lan Montgomery         Date: 2025   MR#: [id number]              : 1955  IDENTIFYING INFORMATION: This is a 69 y.o. old male who is a patient whom presents to clinic for follow up evaluation.   CHIEF COMPLAINT: mood  HISTORY OF PRESENT ILLNESS:  Interval History:  Endorses ongoing mood issues. Not able to tolerate Depakote due to hand pain and swelling around the ankles. Will start Lamictal and titrate effect. Unsure clear if Depakote is the source. Monitor. Discussed following up with PCP if continues to be an issue after stopping Depakote. Continues to receive Ativan and Elavil from PCP. Has therapy appointment in May.   Current Symptoms  Duration: chronic  Sleep: fair to good with Elavil  Appetite: fair  Anxiety: endorses at times  Mood: pleasant  Compliance with medications: endorses  Side effects from the medications: denies from Ativan, Elavil  Current stressors: finances    Memory changes/ADL's if applicable: baseline  Substance History: EtOH: denies Illicit Substances denies  Family History: denies  Social History: , denies abuse hx  Lives with/Support from: lives with wife    Review of Systems:  Constitutional: Negative for chills and fever.  HEENT: Negative for congestion.  Cardiovascular: Negative for chest pain, palpitations.  Respiratory: Negative for cough, shortness of breath, or wheezing.  GI: Negative for nausea and vomiting; constipation, diarrhea.  Psychiatric/Behavioral: See HPI  Neurological: Negative for sensory changes or tingling.  Hematological and Lymphatic: Negative for bleeding or bruising.  MSK: Negative for myalgias.    Current Active Problems:   Patient Active Problem List   Diagnosis    Benign prostatic hyperplasia with urinary hesitancy    Bipolar 1 disorder (HCC)    KATT (obstructive sleep apnea)    Prostate hypertrophy    Hypertension    Elevated PSA    Arthritis    Major depressive disorder, recurrent, mild    Major depressive disorder,

## 2025-02-28 ENCOUNTER — PATIENT MESSAGE (OUTPATIENT)
Dept: INTERNAL MEDICINE CLINIC | Facility: CLINIC | Age: 70
End: 2025-02-28

## 2025-03-02 ENCOUNTER — PATIENT MESSAGE (OUTPATIENT)
Dept: INTERNAL MEDICINE CLINIC | Facility: CLINIC | Age: 70
End: 2025-03-02

## 2025-03-07 ENCOUNTER — HOSPITAL ENCOUNTER (OUTPATIENT)
Dept: LAB | Age: 70
Discharge: HOME | End: 2025-03-07
Payer: MEDICARE

## 2025-03-07 DIAGNOSIS — C92.10 CML (CHRONIC MYELOCYTIC LEUKEMIA) (HCC): ICD-10-CM

## 2025-03-07 LAB
ALBUMIN SERPL-MCNC: 4 G/DL (ref 3.2–4.6)
ALBUMIN/GLOB SERPL: 1.1 (ref 1–1.9)
ALP SERPL-CCNC: 67 U/L (ref 40–129)
ALT SERPL-CCNC: 18 U/L (ref 8–55)
ANION GAP SERPL CALC-SCNC: 12 MMOL/L (ref 7–16)
AST SERPL-CCNC: 28 U/L (ref 15–37)
BASOPHILS # BLD: 0.03 K/UL (ref 0–0.2)
BASOPHILS NFR BLD: 0.5 % (ref 0–2)
BILIRUB SERPL-MCNC: 0.4 MG/DL (ref 0–1.2)
BUN SERPL-MCNC: 14 MG/DL (ref 8–23)
CALCIUM SERPL-MCNC: 8.9 MG/DL (ref 8.8–10.2)
CHLORIDE SERPL-SCNC: 99 MMOL/L (ref 98–107)
CO2 SERPL-SCNC: 27 MMOL/L (ref 20–29)
COLLECTION INFORMATION: NORMAL
CREAT SERPL-MCNC: 1.17 MG/DL (ref 0.8–1.3)
DATE SENT TO REF LAB: NORMAL
DIFFERENTIAL METHOD BLD: ABNORMAL
EOSINOPHIL # BLD: 0.24 K/UL (ref 0–0.8)
EOSINOPHIL NFR BLD: 4.3 % (ref 0.5–7.8)
ERYTHROCYTE [DISTWIDTH] IN BLOOD BY AUTOMATED COUNT: 12.6 % (ref 11.9–14.6)
GLOBULIN SER CALC-MCNC: 3.6 G/DL (ref 2.3–3.5)
GLUCOSE SERPL-MCNC: 107 MG/DL (ref 70–99)
HCT VFR BLD AUTO: 38 % (ref 41.1–50.3)
HGB BLD-MCNC: 12.7 G/DL (ref 13.6–17.2)
IMM GRANULOCYTES # BLD AUTO: 0.01 K/UL (ref 0–0.5)
IMM GRANULOCYTES NFR BLD AUTO: 0.2 % (ref 0–5)
LYMPHOCYTES # BLD: 1.87 K/UL (ref 0.5–4.6)
LYMPHOCYTES NFR BLD: 33.6 % (ref 13–44)
Lab: NORMAL
MCH RBC QN AUTO: 36 PG (ref 26.1–32.9)
MCHC RBC AUTO-ENTMCNC: 33.4 G/DL (ref 31.4–35)
MCV RBC AUTO: 107.6 FL (ref 82–102)
MONOCYTES # BLD: 0.41 K/UL (ref 0.1–1.3)
MONOCYTES NFR BLD: 7.4 % (ref 4–12)
NEUTS SEG # BLD: 3.01 K/UL (ref 1.7–8.2)
NEUTS SEG NFR BLD: 54 % (ref 43–78)
NRBC # BLD: 0 K/UL (ref 0–0.2)
PLATELET # BLD AUTO: 242 K/UL (ref 150–450)
PMV BLD AUTO: 9 FL (ref 9.4–12.3)
POTASSIUM SERPL-SCNC: 4 MMOL/L (ref 3.5–5.1)
PROT SERPL-MCNC: 7.7 G/DL (ref 6.3–8.2)
RBC # BLD AUTO: 3.53 M/UL (ref 4.23–5.6)
SODIUM SERPL-SCNC: 138 MMOL/L (ref 136–145)
WBC # BLD AUTO: 5.6 K/UL (ref 4.3–11.1)

## 2025-03-07 PROCEDURE — 36415 COLL VENOUS BLD VENIPUNCTURE: CPT

## 2025-03-07 PROCEDURE — 80053 COMPREHEN METABOLIC PANEL: CPT

## 2025-03-07 PROCEDURE — 85025 COMPLETE CBC W/AUTO DIFF WBC: CPT

## 2025-03-16 DIAGNOSIS — F31.60 BIPOLAR AFFECTIVE DISORDER, CURRENT EPISODE MIXED, CURRENT EPISODE SEVERITY UNSPECIFIED (HCC): ICD-10-CM

## 2025-03-16 DIAGNOSIS — Z79.899 LONG-TERM CURRENT USE OF BENZODIAZEPINE: ICD-10-CM

## 2025-03-17 RX ORDER — LORAZEPAM 2 MG/1
2 TABLET ORAL EVERY 8 HOURS PRN
Qty: 270 TABLET | Refills: 0 | Status: SHIPPED | OUTPATIENT
Start: 2025-03-17 | End: 2025-06-15

## 2025-03-17 NOTE — TELEPHONE ENCOUNTER
Pt requesting refill of lorazepam.    Scripts last fill date: 12/17 for 90 day supply    Last OV: 1/3  Next OV:6/3

## 2025-03-18 NOTE — TELEPHONE ENCOUNTER
Refill request received for lorazepam.  Last seen in office on 1/3/2025, follow-up visit scheduled for 6/3/2025.  Controlled substance agreement previously signed. PDMP reviewed showing lorazepam 2 mg tablets, quantity #270, 90-day supply last filled on 12/17/2024.  New prescription sent to pharmacy    Orders Placed This Encounter    LORazepam (ATIVAN) 2 MG tablet     Sig: Take 1 tablet by mouth every 8 hours as needed for Anxiety for up to 90 days. May cause drowsiness.  Do not drink alcohol, drive or operate heavy machinery after use Max Daily Amount: 6 mg     Dispense:  270 tablet     Refill:  0

## 2025-03-21 ENCOUNTER — OFFICE VISIT (OUTPATIENT)
Dept: ONCOLOGY | Age: 70
End: 2025-03-21
Payer: MEDICARE

## 2025-03-21 VITALS
DIASTOLIC BLOOD PRESSURE: 78 MMHG | HEIGHT: 74 IN | RESPIRATION RATE: 16 BRPM | TEMPERATURE: 97.4 F | HEART RATE: 80 BPM | WEIGHT: 202.3 LBS | BODY MASS INDEX: 25.96 KG/M2 | OXYGEN SATURATION: 98 % | SYSTOLIC BLOOD PRESSURE: 122 MMHG

## 2025-03-21 DIAGNOSIS — M25.40 JOINT SWELLING: ICD-10-CM

## 2025-03-21 DIAGNOSIS — F31.81 BIPOLAR II DISORDER (HCC): ICD-10-CM

## 2025-03-21 DIAGNOSIS — C92.10 CML (CHRONIC MYELOCYTIC LEUKEMIA) (HCC): Primary | ICD-10-CM

## 2025-03-21 PROCEDURE — 1036F TOBACCO NON-USER: CPT | Performed by: INTERNAL MEDICINE

## 2025-03-21 PROCEDURE — 3078F DIAST BP <80 MM HG: CPT | Performed by: INTERNAL MEDICINE

## 2025-03-21 PROCEDURE — G8428 CUR MEDS NOT DOCUMENT: HCPCS | Performed by: INTERNAL MEDICINE

## 2025-03-21 PROCEDURE — 1123F ACP DISCUSS/DSCN MKR DOCD: CPT | Performed by: INTERNAL MEDICINE

## 2025-03-21 PROCEDURE — G8417 CALC BMI ABV UP PARAM F/U: HCPCS | Performed by: INTERNAL MEDICINE

## 2025-03-21 PROCEDURE — 1125F AMNT PAIN NOTED PAIN PRSNT: CPT | Performed by: INTERNAL MEDICINE

## 2025-03-21 PROCEDURE — 3017F COLORECTAL CA SCREEN DOC REV: CPT | Performed by: INTERNAL MEDICINE

## 2025-03-21 PROCEDURE — 3074F SYST BP LT 130 MM HG: CPT | Performed by: INTERNAL MEDICINE

## 2025-03-21 PROCEDURE — 99214 OFFICE O/P EST MOD 30 MIN: CPT | Performed by: INTERNAL MEDICINE

## 2025-03-21 RX ORDER — IMATINIB MESYLATE 400 MG/1
400 TABLET, FILM COATED ORAL DAILY
Qty: 30 TABLET | Refills: 5 | Status: ACTIVE | OUTPATIENT
Start: 2025-03-21

## 2025-03-21 ASSESSMENT — PATIENT HEALTH QUESTIONNAIRE - PHQ9
2. FEELING DOWN, DEPRESSED OR HOPELESS: SEVERAL DAYS
1. LITTLE INTEREST OR PLEASURE IN DOING THINGS: NOT AT ALL
SUM OF ALL RESPONSES TO PHQ QUESTIONS 1-9: 1

## 2025-03-21 NOTE — PATIENT INSTRUCTIONS
Final    Date sent to [facility] 03/07/2025 COLLECT FOR [facility]   Final                 Please refer to After Visit Summary or [facility] for upcoming appointment information. If you have any questions regarding your upcoming schedule please reach out to your care team through [facility] or call [phone].    Please notify your assigned Nurse Navigator of any unplanned hospital admissions or Emergency Room visits within 24 hours of discharge.    -------------------------------------------------------------------------------------------------------------------  Please call our office at [phone] if you have any  of the following symptoms:   Fever of 100.5 or greater  Chills  Shortness of breath  Swelling or pain in one leg    After office hours an answering service is available and will contact a provider for emergencies or if you are experiencing any of the above symptoms.        ISIDRO SAMPSON RN

## 2025-03-21 NOTE — PROGRESS NOTES
increased MCV are likely due to the Gleevec and are not concerning at this time. He will continue with the current treatment regimen of imatinib 400 mg daily.    2. Swelling.  The swelling could be indicative of synovitis or arthritis, potentially inflammatory in nature. A short course of steroids may be considered to alleviate the inflammation. He has been advised to continue taking Aleve for a few weeks. He has also been advised to maintain his Imodium intake as it is unlikely to be contributing to the swelling. A consultation with Dr. Frazier will be arranged to discuss the possibility of initiating a steroid trial. This should have no relation to his CML or his Gleevec.     Follow-up  The patient will follow up in 3 months.             Monroe Burton MD   [facility]  [address] [place] [address]  Office : [phone]  Fax : [phone]

## 2025-03-30 DIAGNOSIS — I10 ESSENTIAL HYPERTENSION: ICD-10-CM

## 2025-03-30 DIAGNOSIS — E78.5 DYSLIPIDEMIA: ICD-10-CM

## 2025-03-31 RX ORDER — LOVASTATIN 40 MG/1
40 TABLET ORAL NIGHTLY
Qty: 90 TABLET | Refills: 2 | Status: SHIPPED | OUTPATIENT
Start: 2025-03-31

## 2025-03-31 RX ORDER — METOPROLOL TARTRATE 50 MG
50 TABLET ORAL 2 TIMES DAILY
Qty: 180 TABLET | Refills: 2 | Status: SHIPPED | OUTPATIENT
Start: 2025-03-31

## 2025-04-08 ASSESSMENT — PATIENT HEALTH QUESTIONNAIRE - PHQ9
SUM OF ALL RESPONSES TO PHQ QUESTIONS 1-9: 7
SUM OF ALL RESPONSES TO PHQ QUESTIONS 1-9: 7
6. FEELING BAD ABOUT YOURSELF - OR THAT YOU ARE A FAILURE OR HAVE LET YOURSELF OR YOUR FAMILY DOWN: SEVERAL DAYS
10. IF YOU CHECKED OFF ANY PROBLEMS, HOW DIFFICULT HAVE THESE PROBLEMS MADE IT FOR YOU TO DO YOUR WORK, TAKE CARE OF THINGS AT HOME, OR GET ALONG WITH OTHER PEOPLE: NOT DIFFICULT AT ALL
1. LITTLE INTEREST OR PLEASURE IN DOING THINGS: SEVERAL DAYS
7. TROUBLE CONCENTRATING ON THINGS, SUCH AS READING THE NEWSPAPER OR WATCHING TELEVISION: SEVERAL DAYS
2. FEELING DOWN, DEPRESSED OR HOPELESS: SEVERAL DAYS
7. TROUBLE CONCENTRATING ON THINGS, SUCH AS READING THE NEWSPAPER OR WATCHING TELEVISION: SEVERAL DAYS
5. POOR APPETITE OR OVEREATING: NOT AT ALL
SUM OF ALL RESPONSES TO PHQ QUESTIONS 1-9: 7
1. LITTLE INTEREST OR PLEASURE IN DOING THINGS: SEVERAL DAYS
SUM OF ALL RESPONSES TO PHQ QUESTIONS 1-9: 7
9. THOUGHTS THAT YOU WOULD BE BETTER OFF DEAD, OR OF HURTING YOURSELF: NOT AT ALL
10. IF YOU CHECKED OFF ANY PROBLEMS, HOW DIFFICULT HAVE THESE PROBLEMS MADE IT FOR YOU TO DO YOUR WORK, TAKE CARE OF THINGS AT HOME, OR GET ALONG WITH OTHER PEOPLE: NOT DIFFICULT AT ALL
8. MOVING OR SPEAKING SO SLOWLY THAT OTHER PEOPLE COULD HAVE NOTICED. OR THE OPPOSITE - BEING SO FIDGETY OR RESTLESS THAT YOU HAVE BEEN MOVING AROUND A LOT MORE THAN USUAL: SEVERAL DAYS
4. FEELING TIRED OR HAVING LITTLE ENERGY: SEVERAL DAYS
2. FEELING DOWN, DEPRESSED OR HOPELESS: SEVERAL DAYS
8. MOVING OR SPEAKING SO SLOWLY THAT OTHER PEOPLE COULD HAVE NOTICED. OR THE OPPOSITE, BEING SO FIGETY OR RESTLESS THAT YOU HAVE BEEN MOVING AROUND A LOT MORE THAN USUAL: SEVERAL DAYS
3. TROUBLE FALLING OR STAYING ASLEEP: SEVERAL DAYS
6. FEELING BAD ABOUT YOURSELF - OR THAT YOU ARE A FAILURE OR HAVE LET YOURSELF OR YOUR FAMILY DOWN: SEVERAL DAYS
3. TROUBLE FALLING OR STAYING ASLEEP: SEVERAL DAYS
SUM OF ALL RESPONSES TO PHQ QUESTIONS 1-9: 7
5. POOR APPETITE OR OVEREATING: NOT AT ALL
4. FEELING TIRED OR HAVING LITTLE ENERGY: SEVERAL DAYS
9. THOUGHTS THAT YOU WOULD BE BETTER OFF DEAD, OR OF HURTING YOURSELF: NOT AT ALL

## 2025-04-08 ASSESSMENT — ANXIETY QUESTIONNAIRES
3. WORRYING TOO MUCH ABOUT DIFFERENT THINGS: SEVERAL DAYS
4. TROUBLE RELAXING: SEVERAL DAYS
4. TROUBLE RELAXING: SEVERAL DAYS
1. FEELING NERVOUS, ANXIOUS, OR ON EDGE: SEVERAL DAYS
5. BEING SO RESTLESS THAT IT IS HARD TO SIT STILL: SEVERAL DAYS
IF YOU CHECKED OFF ANY PROBLEMS ON THIS QUESTIONNAIRE, HOW DIFFICULT HAVE THESE PROBLEMS MADE IT FOR YOU TO DO YOUR WORK, TAKE CARE OF THINGS AT HOME, OR GET ALONG WITH OTHER PEOPLE: NOT DIFFICULT AT ALL
2. NOT BEING ABLE TO STOP OR CONTROL WORRYING: SEVERAL DAYS
7. FEELING AFRAID AS IF SOMETHING AWFUL MIGHT HAPPEN: SEVERAL DAYS
6. BECOMING EASILY ANNOYED OR IRRITABLE: SEVERAL DAYS
1. FEELING NERVOUS, ANXIOUS, OR ON EDGE: SEVERAL DAYS
IF YOU CHECKED OFF ANY PROBLEMS ON THIS QUESTIONNAIRE, HOW DIFFICULT HAVE THESE PROBLEMS MADE IT FOR YOU TO DO YOUR WORK, TAKE CARE OF THINGS AT HOME, OR GET ALONG WITH OTHER PEOPLE: NOT DIFFICULT AT ALL
6. BECOMING EASILY ANNOYED OR IRRITABLE: SEVERAL DAYS
2. NOT BEING ABLE TO STOP OR CONTROL WORRYING: SEVERAL DAYS
5. BEING SO RESTLESS THAT IT IS HARD TO SIT STILL: SEVERAL DAYS
7. FEELING AFRAID AS IF SOMETHING AWFUL MIGHT HAPPEN: SEVERAL DAYS
3. WORRYING TOO MUCH ABOUT DIFFERENT THINGS: SEVERAL DAYS
GAD7 TOTAL SCORE: 7

## 2025-04-09 ENCOUNTER — OFFICE VISIT (OUTPATIENT)
Dept: BEHAVIORAL/MENTAL HEALTH CLINIC | Facility: CLINIC | Age: 70
End: 2025-04-09
Payer: MEDICARE

## 2025-04-09 VITALS
OXYGEN SATURATION: 98 % | HEIGHT: 74 IN | BODY MASS INDEX: 25.85 KG/M2 | HEART RATE: 81 BPM | WEIGHT: 201.4 LBS | DIASTOLIC BLOOD PRESSURE: 64 MMHG | SYSTOLIC BLOOD PRESSURE: 126 MMHG

## 2025-04-09 DIAGNOSIS — F41.9 ANXIETY DISORDER, UNSPECIFIED TYPE: ICD-10-CM

## 2025-04-09 DIAGNOSIS — F43.23 ADJUSTMENT DISORDER WITH MIXED ANXIETY AND DEPRESSED MOOD: Primary | ICD-10-CM

## 2025-04-09 DIAGNOSIS — G47.00 INSOMNIA, UNSPECIFIED TYPE: ICD-10-CM

## 2025-04-09 PROCEDURE — 3017F COLORECTAL CA SCREEN DOC REV: CPT | Performed by: PSYCHIATRY & NEUROLOGY

## 2025-04-09 PROCEDURE — 99214 OFFICE O/P EST MOD 30 MIN: CPT | Performed by: PSYCHIATRY & NEUROLOGY

## 2025-04-09 PROCEDURE — G8417 CALC BMI ABV UP PARAM F/U: HCPCS | Performed by: PSYCHIATRY & NEUROLOGY

## 2025-04-09 PROCEDURE — 3078F DIAST BP <80 MM HG: CPT | Performed by: PSYCHIATRY & NEUROLOGY

## 2025-04-09 PROCEDURE — 1123F ACP DISCUSS/DSCN MKR DOCD: CPT | Performed by: PSYCHIATRY & NEUROLOGY

## 2025-04-09 PROCEDURE — 1036F TOBACCO NON-USER: CPT | Performed by: PSYCHIATRY & NEUROLOGY

## 2025-04-09 PROCEDURE — 1159F MED LIST DOCD IN RCRD: CPT | Performed by: PSYCHIATRY & NEUROLOGY

## 2025-04-09 PROCEDURE — 3074F SYST BP LT 130 MM HG: CPT | Performed by: PSYCHIATRY & NEUROLOGY

## 2025-04-09 PROCEDURE — G8427 DOCREV CUR MEDS BY ELIG CLIN: HCPCS | Performed by: PSYCHIATRY & NEUROLOGY

## 2025-04-09 RX ORDER — LAMOTRIGINE 100 MG/1
100 TABLET ORAL DAILY
Qty: 90 TABLET | Refills: 0 | Status: SHIPPED | OUTPATIENT
Start: 2025-04-09

## 2025-04-09 NOTE — PROGRESS NOTES
Medical Dictation Software and while attempts have been made to ensure accuracy, certain words and phrases may not be transcribed as intended.

## 2025-05-21 ENCOUNTER — OFFICE VISIT (OUTPATIENT)
Dept: BEHAVIORAL/MENTAL HEALTH CLINIC | Facility: CLINIC | Age: 70
End: 2025-05-21
Payer: MEDICARE

## 2025-05-21 DIAGNOSIS — F41.9 ANXIETY: ICD-10-CM

## 2025-05-21 DIAGNOSIS — F33.0 MILD EPISODE OF RECURRENT MAJOR DEPRESSIVE DISORDER: Primary | ICD-10-CM

## 2025-05-21 PROCEDURE — 1123F ACP DISCUSS/DSCN MKR DOCD: CPT | Performed by: COUNSELOR

## 2025-05-21 PROCEDURE — 1036F TOBACCO NON-USER: CPT | Performed by: COUNSELOR

## 2025-05-21 PROCEDURE — 90791 PSYCH DIAGNOSTIC EVALUATION: CPT | Performed by: COUNSELOR

## 2025-05-21 ASSESSMENT — PATIENT HEALTH QUESTIONNAIRE - PHQ9
SUM OF ALL RESPONSES TO PHQ QUESTIONS 1-9: 8
7. TROUBLE CONCENTRATING ON THINGS, SUCH AS READING THE NEWSPAPER OR WATCHING TELEVISION: SEVERAL DAYS
5. POOR APPETITE OR OVEREATING: NOT AT ALL
2. FEELING DOWN, DEPRESSED OR HOPELESS: SEVERAL DAYS
SUM OF ALL RESPONSES TO PHQ QUESTIONS 1-9: 8
8. MOVING OR SPEAKING SO SLOWLY THAT OTHER PEOPLE COULD HAVE NOTICED. OR THE OPPOSITE, BEING SO FIGETY OR RESTLESS THAT YOU HAVE BEEN MOVING AROUND A LOT MORE THAN USUAL: SEVERAL DAYS
9. THOUGHTS THAT YOU WOULD BE BETTER OFF DEAD, OR OF HURTING YOURSELF: NOT AT ALL
1. LITTLE INTEREST OR PLEASURE IN DOING THINGS: SEVERAL DAYS
4. FEELING TIRED OR HAVING LITTLE ENERGY: SEVERAL DAYS
SUM OF ALL RESPONSES TO PHQ QUESTIONS 1-9: 8
10. IF YOU CHECKED OFF ANY PROBLEMS, HOW DIFFICULT HAVE THESE PROBLEMS MADE IT FOR YOU TO DO YOUR WORK, TAKE CARE OF THINGS AT HOME, OR GET ALONG WITH OTHER PEOPLE: SOMEWHAT DIFFICULT
SUM OF ALL RESPONSES TO PHQ QUESTIONS 1-9: 8
3. TROUBLE FALLING OR STAYING ASLEEP: MORE THAN HALF THE DAYS
6. FEELING BAD ABOUT YOURSELF - OR THAT YOU ARE A FAILURE OR HAVE LET YOURSELF OR YOUR FAMILY DOWN: SEVERAL DAYS

## 2025-05-21 NOTE — PROGRESS NOTES
affected area twice a day as needed for rash 454 g 2    Potassium 99 MG TABS Take by mouth       No current facility-administered medications for this visit.     Current Mental Health Symptoms:   []None   [x]sadness, []crying spells, [x]low motivation, [x]fatigue, [x]lack of interest,   [x]decreased concentration, [x]anxiety []panic attacks, []suicidal thoughts, []homicidal thoughts, []hallucinations,    []delusions, []racing thoughts, []grandiosity, []carisa,   []eating disordered symptoms, []obsessions and compulsions, []hopelessness,   [x]feelings of failure, [x]excessive worrying [x]Other - poor sleep, trouble relaxing, irritability, feeling afraid as if something awful might happen    Mental Health History (including family history):  []Current Therapy    []Inpatient Treatment  [x]Past Therapy    []PCP  [x]Current Psychiatrist   []Family History- paternal  []Past Psychiatrist    []Family History- maternal     Pt is receiving medication management from Nabil Banda MD.   Pt reported being in therapy when he was in high school.     Orientation: Pt was oriented to person, place, time, and purpose of interview.    Appearance/Personal Hygiene: Pt was appropriately dressed and neatly groomed.    Eye Contact: Good    Psychosis:  Hallucinations: None  Paranoia/Delusions: None                                          Insight/Judgment: Insight and judgment were intact.     Intelligence: Intelligence level appears to be WNL.    Memory/Cognition/Executive Functioning:  Pt denies memory deficits.  Executive functioning skills appear to be intact.    Fund of Knowledge:  Fund of knowledge appears to be WNL.    Attention Span/Concentration: Attention Span/Concentration appears to be WNL.    Developmental/Language Issues: Developmental/Language Issues appears to be WNL.    Mood/Affect:   []Angry  []Anxious  [x]Appropriate  []Bright   []Distressed  []Fatigued  []Flat   []Sad,

## 2025-05-31 NOTE — ASSESSMENT & PLAN NOTE
PSA up to 7 on 9/13/2024  Per patient longstanding history of elevated PSA levels undergoing multiple biopsies in the past reportedly benign  Prior urology notes with history of prostate biopsy in May 2005 for a PSA of 8.9, biopsy in 2016 for PSA of 22 (also underwent TURP secondary to urinary retention and failed voiding trials).  Biopsy results were negative for malignancy at that time however were notable for inflammation  Have offered referral back to urology for continued surveillance and further evaluation but respectfully declines at this time

## 2025-05-31 NOTE — PROGRESS NOTES
Lan Montgomery (:  1955) is a 70 y.o. male,Established patient, here for evaluation of the following chief complaint(s):  Hypertension (5 mth f/u on Htn, adjustment disorder, insomnia, chronic low back pain. Pt is NPO. )         Assessment & Plan  CML (chronic myelocytic leukemia) (HCC)  Peripheral smear on 3/20/2023 with absolute left shifted neutrophilia with basophilia suspicious for a chronic myeloid neoplasm  Positive BCR able 1 translocation on 2023  Flow cytometry with relative left shifted myeloid hyperplasia with 0.1% CD34 positive myeloblasts and relatively increased basophils detected  Continue Gleevec per oncology         Adjustment disorder with mixed anxiety and depressed mood       Orders:    LORazepam (ATIVAN) 2 MG tablet; Take 1 tablet by mouth every 8 hours as needed for Anxiety for up to 90 days. May cause drowsiness.  Do not drink alcohol, drive or operate heavy machinery after use. DO NOT FILL PRIOR TO 25 Max Daily Amount: 6 mg    Long-term current use of benzodiazepine  Questionable history of bipolar disorder   Previously on Depakote extended release  Continue Lamictal, as needed lorazepam  Follow-up per psychiatry.  Also undergoing cognitive behavioral therapy with counselor  Controlled substance agreement previously signed  PDMP reviewed showing lorazepam 2 mg tablets, quantity #270, 90-day supply last filled on 3/19/2025.  Predated prescription sent to pharmacy on record.    Orders:    LORazepam (ATIVAN) 2 MG tablet; Take 1 tablet by mouth every 8 hours as needed for Anxiety for up to 90 days. May cause drowsiness.  Do not drink alcohol, drive or operate heavy machinery after use. DO NOT FILL PRIOR TO 25 Max Daily Amount: 6 mg    Essential hypertension  Continue metoprolol tartrate    Orders:    CBC with Auto Differential; Future    Comprehensive Metabolic Panel; Future    Lipid Panel; Future    TSH; Future    Urinalysis; Future    metoprolol tartrate (LOPRESSOR) 50

## 2025-06-03 ENCOUNTER — OFFICE VISIT (OUTPATIENT)
Dept: INTERNAL MEDICINE CLINIC | Facility: CLINIC | Age: 70
End: 2025-06-03
Payer: MEDICARE

## 2025-06-03 VITALS
SYSTOLIC BLOOD PRESSURE: 120 MMHG | BODY MASS INDEX: 25.95 KG/M2 | HEART RATE: 81 BPM | TEMPERATURE: 98.2 F | HEIGHT: 74 IN | OXYGEN SATURATION: 93 % | DIASTOLIC BLOOD PRESSURE: 68 MMHG | WEIGHT: 202.2 LBS

## 2025-06-03 DIAGNOSIS — M13.0 POLYARTHROPATHY: ICD-10-CM

## 2025-06-03 DIAGNOSIS — C92.10 CML (CHRONIC MYELOCYTIC LEUKEMIA) (HCC): Primary | ICD-10-CM

## 2025-06-03 DIAGNOSIS — Z79.899 LONG-TERM CURRENT USE OF BENZODIAZEPINE: ICD-10-CM

## 2025-06-03 DIAGNOSIS — F43.23 ADJUSTMENT DISORDER WITH MIXED ANXIETY AND DEPRESSED MOOD: ICD-10-CM

## 2025-06-03 DIAGNOSIS — E78.5 DYSLIPIDEMIA: ICD-10-CM

## 2025-06-03 DIAGNOSIS — F51.04 CHRONIC INSOMNIA: ICD-10-CM

## 2025-06-03 DIAGNOSIS — Z87.2 HISTORY OF CONTACT DERMATITIS: ICD-10-CM

## 2025-06-03 DIAGNOSIS — I10 ESSENTIAL HYPERTENSION: ICD-10-CM

## 2025-06-03 DIAGNOSIS — D53.9 MACROCYTIC ANEMIA: ICD-10-CM

## 2025-06-03 PROCEDURE — 3017F COLORECTAL CA SCREEN DOC REV: CPT | Performed by: STUDENT IN AN ORGANIZED HEALTH CARE EDUCATION/TRAINING PROGRAM

## 2025-06-03 PROCEDURE — 3078F DIAST BP <80 MM HG: CPT | Performed by: STUDENT IN AN ORGANIZED HEALTH CARE EDUCATION/TRAINING PROGRAM

## 2025-06-03 PROCEDURE — G8428 CUR MEDS NOT DOCUMENT: HCPCS | Performed by: STUDENT IN AN ORGANIZED HEALTH CARE EDUCATION/TRAINING PROGRAM

## 2025-06-03 PROCEDURE — 1036F TOBACCO NON-USER: CPT | Performed by: STUDENT IN AN ORGANIZED HEALTH CARE EDUCATION/TRAINING PROGRAM

## 2025-06-03 PROCEDURE — 1123F ACP DISCUSS/DSCN MKR DOCD: CPT | Performed by: STUDENT IN AN ORGANIZED HEALTH CARE EDUCATION/TRAINING PROGRAM

## 2025-06-03 PROCEDURE — G8417 CALC BMI ABV UP PARAM F/U: HCPCS | Performed by: STUDENT IN AN ORGANIZED HEALTH CARE EDUCATION/TRAINING PROGRAM

## 2025-06-03 PROCEDURE — 99215 OFFICE O/P EST HI 40 MIN: CPT | Performed by: STUDENT IN AN ORGANIZED HEALTH CARE EDUCATION/TRAINING PROGRAM

## 2025-06-03 PROCEDURE — 3074F SYST BP LT 130 MM HG: CPT | Performed by: STUDENT IN AN ORGANIZED HEALTH CARE EDUCATION/TRAINING PROGRAM

## 2025-06-03 RX ORDER — TRIAMCINOLONE ACETONIDE 1 MG/G
CREAM TOPICAL
Qty: 454 G | Refills: 2 | Status: SHIPPED | OUTPATIENT
Start: 2025-06-03

## 2025-06-03 RX ORDER — LORAZEPAM 2 MG/1
2 TABLET ORAL EVERY 8 HOURS PRN
Qty: 270 TABLET | Refills: 0 | Status: SHIPPED | OUTPATIENT
Start: 2025-06-17 | End: 2025-09-15

## 2025-06-03 RX ORDER — METOPROLOL TARTRATE 50 MG
50 TABLET ORAL 2 TIMES DAILY
Qty: 180 TABLET | Refills: 2 | Status: SHIPPED | OUTPATIENT
Start: 2025-06-03

## 2025-06-03 RX ORDER — MELOXICAM 15 MG/1
TABLET ORAL
Qty: 30 TABLET | Refills: 3 | Status: SHIPPED | OUTPATIENT
Start: 2025-06-03

## 2025-06-03 RX ORDER — AMITRIPTYLINE HYDROCHLORIDE 50 MG/1
50 TABLET ORAL NIGHTLY
Qty: 90 TABLET | Refills: 2 | Status: SHIPPED | OUTPATIENT
Start: 2025-07-14

## 2025-06-03 SDOH — ECONOMIC STABILITY: FOOD INSECURITY: WITHIN THE PAST 12 MONTHS, THE FOOD YOU BOUGHT JUST DIDN'T LAST AND YOU DIDN'T HAVE MONEY TO GET MORE.: NEVER TRUE

## 2025-06-03 SDOH — ECONOMIC STABILITY: FOOD INSECURITY: WITHIN THE PAST 12 MONTHS, YOU WORRIED THAT YOUR FOOD WOULD RUN OUT BEFORE YOU GOT MONEY TO BUY MORE.: NEVER TRUE

## 2025-06-03 ASSESSMENT — ENCOUNTER SYMPTOMS
WHEEZING: 0
SHORTNESS OF BREATH: 0
COUGH: 0

## 2025-06-13 ENCOUNTER — HOSPITAL ENCOUNTER (OUTPATIENT)
Dept: LAB | Age: 70
Discharge: HOME | End: 2025-06-13
Payer: MEDICARE

## 2025-06-13 DIAGNOSIS — E78.5 DYSLIPIDEMIA: ICD-10-CM

## 2025-06-13 DIAGNOSIS — I10 ESSENTIAL HYPERTENSION: ICD-10-CM

## 2025-06-13 DIAGNOSIS — C92.10 CML (CHRONIC MYELOCYTIC LEUKEMIA) (HCC): ICD-10-CM

## 2025-06-13 DIAGNOSIS — M13.0 POLYARTHROPATHY: ICD-10-CM

## 2025-06-13 LAB
ALBUMIN SERPL-MCNC: 3.9 G/DL (ref 3.2–4.6)
ALBUMIN/GLOB SERPL: 1.1 (ref 1–1.9)
ALP SERPL-CCNC: 68 U/L (ref 40–129)
ALT SERPL-CCNC: 12 U/L (ref 8–55)
ANION GAP SERPL CALC-SCNC: 10 MMOL/L (ref 7–16)
AST SERPL-CCNC: 28 U/L (ref 15–37)
BASOPHILS # BLD: 0.04 K/UL (ref 0–0.2)
BASOPHILS NFR BLD: 0.7 % (ref 0–2)
BILIRUB SERPL-MCNC: 0.6 MG/DL (ref 0–1.2)
BUN SERPL-MCNC: 15 MG/DL (ref 8–23)
CALCIUM SERPL-MCNC: 8.8 MG/DL (ref 8.8–10.2)
CHLORIDE SERPL-SCNC: 102 MMOL/L (ref 98–107)
CO2 SERPL-SCNC: 25 MMOL/L (ref 20–29)
CREAT SERPL-MCNC: 1.11 MG/DL (ref 0.8–1.3)
DIFFERENTIAL METHOD BLD: ABNORMAL
EOSINOPHIL # BLD: 0.31 K/UL (ref 0–0.8)
EOSINOPHIL NFR BLD: 5.2 % (ref 0.5–7.8)
ERYTHROCYTE [DISTWIDTH] IN BLOOD BY AUTOMATED COUNT: 13.3 % (ref 11.9–14.6)
GLOBULIN SER CALC-MCNC: 3.4 G/DL (ref 2.3–3.5)
GLUCOSE SERPL-MCNC: 110 MG/DL (ref 70–99)
HCT VFR BLD AUTO: 35.7 % (ref 41.1–50.3)
HGB BLD-MCNC: 12.2 G/DL (ref 13.6–17.2)
IMM GRANULOCYTES # BLD AUTO: 0.01 K/UL (ref 0–0.5)
IMM GRANULOCYTES NFR BLD AUTO: 0.2 % (ref 0–5)
LYMPHOCYTES # BLD: 1.93 K/UL (ref 0.5–4.6)
LYMPHOCYTES NFR BLD: 32.4 % (ref 13–44)
MCH RBC QN AUTO: 36.1 PG (ref 26.1–32.9)
MCHC RBC AUTO-ENTMCNC: 34.2 G/DL (ref 31.4–35)
MCV RBC AUTO: 105.6 FL (ref 82–102)
MONOCYTES # BLD: 0.4 K/UL (ref 0.1–1.3)
MONOCYTES NFR BLD: 6.7 % (ref 4–12)
NEUTS SEG # BLD: 3.27 K/UL (ref 1.7–8.2)
NEUTS SEG NFR BLD: 54.8 % (ref 43–78)
NRBC # BLD: 0 K/UL (ref 0–0.2)
PLATELET # BLD AUTO: 219 K/UL (ref 150–450)
PMV BLD AUTO: 8.7 FL (ref 9.4–12.3)
POTASSIUM SERPL-SCNC: 4.1 MMOL/L (ref 3.5–5.1)
PROT SERPL-MCNC: 7.2 G/DL (ref 6.3–8.2)
RBC # BLD AUTO: 3.38 M/UL (ref 4.23–5.6)
SODIUM SERPL-SCNC: 137 MMOL/L (ref 136–145)
WBC # BLD AUTO: 6 K/UL (ref 4.3–11.1)

## 2025-06-13 PROCEDURE — 85025 COMPLETE CBC W/AUTO DIFF WBC: CPT

## 2025-06-13 PROCEDURE — 36415 COLL VENOUS BLD VENIPUNCTURE: CPT

## 2025-06-13 PROCEDURE — 80053 COMPREHEN METABOLIC PANEL: CPT

## 2025-06-17 DIAGNOSIS — F43.23 ADJUSTMENT DISORDER WITH MIXED ANXIETY AND DEPRESSED MOOD: ICD-10-CM

## 2025-06-17 DIAGNOSIS — Z79.899 LONG-TERM CURRENT USE OF BENZODIAZEPINE: ICD-10-CM

## 2025-06-17 RX ORDER — LORAZEPAM 2 MG/1
2 TABLET ORAL EVERY 8 HOURS PRN
Qty: 270 TABLET | Refills: 0 | Status: CANCELLED | OUTPATIENT
Start: 2025-06-17 | End: 2025-09-15

## 2025-06-17 NOTE — TELEPHONE ENCOUNTER
Patient requesting refill of lorazepam.  However at recent visit on 6/3/2025 I had sent in a 90-day supply that was predated not to be filled before 6/17/2025.  Patient just need to contact pharmacy to activate this prescription.

## 2025-06-20 ENCOUNTER — OFFICE VISIT (OUTPATIENT)
Dept: ONCOLOGY | Age: 70
End: 2025-06-20
Payer: MEDICARE

## 2025-06-20 VITALS
OXYGEN SATURATION: 97 % | BODY MASS INDEX: 26.18 KG/M2 | RESPIRATION RATE: 18 BRPM | HEART RATE: 84 BPM | SYSTOLIC BLOOD PRESSURE: 109 MMHG | TEMPERATURE: 98.5 F | WEIGHT: 204 LBS | DIASTOLIC BLOOD PRESSURE: 76 MMHG | HEIGHT: 74 IN

## 2025-06-20 DIAGNOSIS — C92.10 CML (CHRONIC MYELOCYTIC LEUKEMIA) (HCC): Primary | ICD-10-CM

## 2025-06-20 PROCEDURE — 99214 OFFICE O/P EST MOD 30 MIN: CPT | Performed by: NURSE PRACTITIONER

## 2025-06-20 PROCEDURE — G8417 CALC BMI ABV UP PARAM F/U: HCPCS | Performed by: NURSE PRACTITIONER

## 2025-06-20 PROCEDURE — 1125F AMNT PAIN NOTED PAIN PRSNT: CPT | Performed by: NURSE PRACTITIONER

## 2025-06-20 PROCEDURE — 3017F COLORECTAL CA SCREEN DOC REV: CPT | Performed by: NURSE PRACTITIONER

## 2025-06-20 PROCEDURE — G8427 DOCREV CUR MEDS BY ELIG CLIN: HCPCS | Performed by: NURSE PRACTITIONER

## 2025-06-20 PROCEDURE — 3074F SYST BP LT 130 MM HG: CPT | Performed by: NURSE PRACTITIONER

## 2025-06-20 PROCEDURE — 1036F TOBACCO NON-USER: CPT | Performed by: NURSE PRACTITIONER

## 2025-06-20 PROCEDURE — 3078F DIAST BP <80 MM HG: CPT | Performed by: NURSE PRACTITIONER

## 2025-06-20 PROCEDURE — 1159F MED LIST DOCD IN RCRD: CPT | Performed by: NURSE PRACTITIONER

## 2025-06-20 PROCEDURE — 1123F ACP DISCUSS/DSCN MKR DOCD: CPT | Performed by: NURSE PRACTITIONER

## 2025-06-20 NOTE — PROGRESS NOTES
[facility] On[facility]: Office Visit Established Patient    Chief Complaint:    Chief Complaint   Patient presents with    Follow-up       History of Present Illness:  Mr. Montgomery is a 70 y.o. male who presents today for follow-up of CML.  He presented to his PCP on 3/6/23 for routine follow-up.  Labs drawn on 2/27/23 prior to his appointment included a CBC which was significant for WBC 19.0, abs basophils 0.8, segs abs 11.5, basophils 4, immature granulocytes 10, and abs immature granulocytes 1.9. Patient denied recent corticosteroid use or other symptoms of acute illness. CBC was rechecked and peripheral smear was drawn to evaluate for abnormal morphology and confirm leukocytosis.  Repeat CBC drawn on 3/20/23 was significant for WBC 22.4, abs basophils 0.9, segs abs 14.6, basophils 4%, immature granulocytes 11%.  PBS showed WBC with absolute left shifted neutrophilia with basophilia suspicious for a chronic myeloid neoplasm, and BCR-ABL1 testing was recommended.  Mr. Montgomery was referred to [facility] for hematology evaluation of leukocytosis.  We recommended testing including BCR/abl and flow cytometry, this confirmed CML.  I recommend that he start imatinib 400 mg daily.  We discussed side effects including GI effects and cytopenias, he understands and agrees to proceed.  He had an excellent response to therapy and this has continued.      History of Present Illness  The patient is a 69-year-old male presenting for follow-up of chronic myeloid leukemia (CML), currently managed with imatinib 400 mg daily.    The patient reports experiencing generalized edema without identifiable triggers. Despite implementing dietary modifications to reduce sodium intake, there has been no improvement in his symptoms. He has discontinued two psychiatric medications (Lamictal and Depakote) but continues to experience edema and joint pain. The patient has been using naproxen (Aleve), which provided relief, but was

## 2025-07-06 DIAGNOSIS — F43.23 ADJUSTMENT DISORDER WITH MIXED ANXIETY AND DEPRESSED MOOD: ICD-10-CM

## 2025-07-07 RX ORDER — LAMOTRIGINE 100 MG/1
100 TABLET ORAL DAILY
Qty: 90 TABLET | Refills: 0 | OUTPATIENT
Start: 2025-07-07

## 2025-07-09 ENCOUNTER — PATIENT MESSAGE (OUTPATIENT)
Dept: INTERNAL MEDICINE CLINIC | Facility: CLINIC | Age: 70
End: 2025-07-09

## 2025-07-09 DIAGNOSIS — F43.23 ADJUSTMENT DISORDER WITH MIXED ANXIETY AND DEPRESSED MOOD: ICD-10-CM

## 2025-07-10 RX ORDER — LAMOTRIGINE 100 MG/1
100 TABLET ORAL DAILY
Qty: 90 TABLET | Refills: 2 | Status: SHIPPED | OUTPATIENT
Start: 2025-07-10

## 2025-07-28 ENCOUNTER — LAB (OUTPATIENT)
Dept: INTERNAL MEDICINE CLINIC | Facility: CLINIC | Age: 70
End: 2025-07-28

## 2025-07-28 DIAGNOSIS — E78.5 DYSLIPIDEMIA: ICD-10-CM

## 2025-07-28 DIAGNOSIS — R73.03 PREDIABETES: ICD-10-CM

## 2025-07-28 DIAGNOSIS — I10 ESSENTIAL HYPERTENSION: ICD-10-CM

## 2025-07-28 DIAGNOSIS — I10 ESSENTIAL HYPERTENSION: Primary | ICD-10-CM

## 2025-07-28 LAB
ALBUMIN SERPL-MCNC: 3.8 G/DL (ref 3.2–4.6)
ALBUMIN/GLOB SERPL: 1.3 (ref 1–1.9)
ALP SERPL-CCNC: 65 U/L (ref 40–129)
ALT SERPL-CCNC: 17 U/L (ref 8–55)
ANION GAP SERPL CALC-SCNC: 12 MMOL/L (ref 7–16)
AST SERPL-CCNC: 33 U/L (ref 15–37)
BASOPHILS # BLD: 0.05 K/UL (ref 0–0.2)
BASOPHILS NFR BLD: 0.9 % (ref 0–2)
BILIRUB SERPL-MCNC: 0.6 MG/DL (ref 0–1.2)
BUN SERPL-MCNC: 14 MG/DL (ref 8–23)
CALCIUM SERPL-MCNC: 9.1 MG/DL (ref 8.8–10.2)
CHLORIDE SERPL-SCNC: 102 MMOL/L (ref 98–107)
CHOLEST SERPL-MCNC: 123 MG/DL (ref 0–200)
CO2 SERPL-SCNC: 23 MMOL/L (ref 20–29)
CREAT SERPL-MCNC: 1.09 MG/DL (ref 0.8–1.3)
DIFFERENTIAL METHOD BLD: ABNORMAL
EOSINOPHIL # BLD: 0.28 K/UL (ref 0–0.8)
EOSINOPHIL NFR BLD: 5 % (ref 0.5–7.8)
ERYTHROCYTE [DISTWIDTH] IN BLOOD BY AUTOMATED COUNT: 13.3 % (ref 11.9–14.6)
EST. AVERAGE GLUCOSE BLD GHB EST-MCNC: 117 MG/DL
GLOBULIN SER CALC-MCNC: 2.9 G/DL (ref 2.3–3.5)
GLUCOSE SERPL-MCNC: 123 MG/DL (ref 70–99)
HBA1C MFR BLD: 5.7 % (ref 0–5.6)
HCT VFR BLD AUTO: 37.1 % (ref 41.1–50.3)
HDLC SERPL-MCNC: 55 MG/DL (ref 40–60)
HDLC SERPL: 2.2 (ref 0–5)
HGB BLD-MCNC: 12.3 G/DL (ref 13.6–17.2)
IMM GRANULOCYTES # BLD AUTO: 0.01 K/UL (ref 0–0.5)
IMM GRANULOCYTES NFR BLD AUTO: 0.2 % (ref 0–5)
LDLC SERPL CALC-MCNC: 52 MG/DL (ref 0–100)
LYMPHOCYTES # BLD: 1.42 K/UL (ref 0.5–4.6)
LYMPHOCYTES NFR BLD: 25.4 % (ref 13–44)
MCH RBC QN AUTO: 35.5 PG (ref 26.1–32.9)
MCHC RBC AUTO-ENTMCNC: 33.2 G/DL (ref 31.4–35)
MCV RBC AUTO: 107.2 FL (ref 82–102)
MONOCYTES # BLD: 0.39 K/UL (ref 0.1–1.3)
MONOCYTES NFR BLD: 7 % (ref 4–12)
NEUTS SEG # BLD: 3.45 K/UL (ref 1.7–8.2)
NEUTS SEG NFR BLD: 61.5 % (ref 43–78)
NRBC # BLD: 0 K/UL (ref 0–0.2)
PLATELET # BLD AUTO: 243 K/UL (ref 150–450)
PMV BLD AUTO: 9.4 FL (ref 9.4–12.3)
POTASSIUM SERPL-SCNC: 4.2 MMOL/L (ref 3.5–5.1)
PROT SERPL-MCNC: 6.8 G/DL (ref 6.3–8.2)
RBC # BLD AUTO: 3.46 M/UL (ref 4.23–5.6)
SODIUM SERPL-SCNC: 137 MMOL/L (ref 136–145)
TRIGL SERPL-MCNC: 78 MG/DL (ref 0–150)
VLDLC SERPL CALC-MCNC: 16 MG/DL (ref 6–23)
WBC # BLD AUTO: 5.6 K/UL (ref 4.3–11.1)

## 2025-08-14 DIAGNOSIS — M13.0 POLYARTHROPATHY: ICD-10-CM

## 2025-08-15 RX ORDER — MELOXICAM 15 MG/1
TABLET ORAL
Qty: 30 TABLET | Refills: 3 | Status: SHIPPED | OUTPATIENT
Start: 2025-08-15

## 2025-08-27 ENCOUNTER — OFFICE VISIT (OUTPATIENT)
Dept: INTERNAL MEDICINE CLINIC | Facility: CLINIC | Age: 70
End: 2025-08-27
Payer: MEDICARE

## 2025-08-27 VITALS
TEMPERATURE: 97.1 F | DIASTOLIC BLOOD PRESSURE: 72 MMHG | SYSTOLIC BLOOD PRESSURE: 130 MMHG | OXYGEN SATURATION: 99 % | BODY MASS INDEX: 25.44 KG/M2 | RESPIRATION RATE: 14 BRPM | HEART RATE: 89 BPM | HEIGHT: 74 IN | WEIGHT: 198.2 LBS

## 2025-08-27 DIAGNOSIS — F51.04 CHRONIC INSOMNIA: ICD-10-CM

## 2025-08-27 DIAGNOSIS — C92.10 CML (CHRONIC MYELOCYTIC LEUKEMIA) (HCC): ICD-10-CM

## 2025-08-27 DIAGNOSIS — F41.9 ANXIETY: ICD-10-CM

## 2025-08-27 DIAGNOSIS — I10 ESSENTIAL HYPERTENSION: ICD-10-CM

## 2025-08-27 DIAGNOSIS — R73.03 PREDIABETES: ICD-10-CM

## 2025-08-27 DIAGNOSIS — Z00.00 MEDICARE ANNUAL WELLNESS VISIT, SUBSEQUENT: Primary | ICD-10-CM

## 2025-08-27 DIAGNOSIS — E78.5 DYSLIPIDEMIA: ICD-10-CM

## 2025-08-27 PROCEDURE — 1036F TOBACCO NON-USER: CPT | Performed by: PHYSICIAN ASSISTANT

## 2025-08-27 PROCEDURE — 99214 OFFICE O/P EST MOD 30 MIN: CPT | Performed by: PHYSICIAN ASSISTANT

## 2025-08-27 PROCEDURE — 3017F COLORECTAL CA SCREEN DOC REV: CPT | Performed by: PHYSICIAN ASSISTANT

## 2025-08-27 PROCEDURE — 1159F MED LIST DOCD IN RCRD: CPT | Performed by: PHYSICIAN ASSISTANT

## 2025-08-27 PROCEDURE — 1123F ACP DISCUSS/DSCN MKR DOCD: CPT | Performed by: PHYSICIAN ASSISTANT

## 2025-08-27 PROCEDURE — G0439 PPPS, SUBSEQ VISIT: HCPCS | Performed by: PHYSICIAN ASSISTANT

## 2025-08-27 PROCEDURE — G8417 CALC BMI ABV UP PARAM F/U: HCPCS | Performed by: PHYSICIAN ASSISTANT

## 2025-08-27 PROCEDURE — G8427 DOCREV CUR MEDS BY ELIG CLIN: HCPCS | Performed by: PHYSICIAN ASSISTANT

## 2025-08-27 PROCEDURE — 3078F DIAST BP <80 MM HG: CPT | Performed by: PHYSICIAN ASSISTANT

## 2025-08-27 PROCEDURE — 3075F SYST BP GE 130 - 139MM HG: CPT | Performed by: PHYSICIAN ASSISTANT

## 2025-08-27 ASSESSMENT — PATIENT HEALTH QUESTIONNAIRE - PHQ9
SUM OF ALL RESPONSES TO PHQ QUESTIONS 1-9: 2
3. TROUBLE FALLING OR STAYING ASLEEP: NOT AT ALL
SUM OF ALL RESPONSES TO PHQ QUESTIONS 1-9: 2
9. THOUGHTS THAT YOU WOULD BE BETTER OFF DEAD, OR OF HURTING YOURSELF: NOT AT ALL
6. FEELING BAD ABOUT YOURSELF - OR THAT YOU ARE A FAILURE OR HAVE LET YOURSELF OR YOUR FAMILY DOWN: NOT AT ALL
5. POOR APPETITE OR OVEREATING: NOT AT ALL
7. TROUBLE CONCENTRATING ON THINGS, SUCH AS READING THE NEWSPAPER OR WATCHING TELEVISION: NOT AT ALL
1. LITTLE INTEREST OR PLEASURE IN DOING THINGS: SEVERAL DAYS
4. FEELING TIRED OR HAVING LITTLE ENERGY: NOT AT ALL
8. MOVING OR SPEAKING SO SLOWLY THAT OTHER PEOPLE COULD HAVE NOTICED. OR THE OPPOSITE, BEING SO FIGETY OR RESTLESS THAT YOU HAVE BEEN MOVING AROUND A LOT MORE THAN USUAL: NOT AT ALL
SUM OF ALL RESPONSES TO PHQ QUESTIONS 1-9: 2
10. IF YOU CHECKED OFF ANY PROBLEMS, HOW DIFFICULT HAVE THESE PROBLEMS MADE IT FOR YOU TO DO YOUR WORK, TAKE CARE OF THINGS AT HOME, OR GET ALONG WITH OTHER PEOPLE: NOT DIFFICULT AT ALL
SUM OF ALL RESPONSES TO PHQ QUESTIONS 1-9: 2
2. FEELING DOWN, DEPRESSED OR HOPELESS: SEVERAL DAYS